# Patient Record
Sex: FEMALE | Race: WHITE | NOT HISPANIC OR LATINO | Employment: OTHER | URBAN - NONMETROPOLITAN AREA
[De-identification: names, ages, dates, MRNs, and addresses within clinical notes are randomized per-mention and may not be internally consistent; named-entity substitution may affect disease eponyms.]

---

## 2024-02-23 ENCOUNTER — OFFICE VISIT (OUTPATIENT)
Dept: FAMILY MEDICINE CLINIC | Facility: CLINIC | Age: 69
End: 2024-02-23
Payer: MEDICARE

## 2024-02-23 VITALS
SYSTOLIC BLOOD PRESSURE: 126 MMHG | DIASTOLIC BLOOD PRESSURE: 80 MMHG | HEART RATE: 96 BPM | HEIGHT: 66 IN | TEMPERATURE: 98.6 F | BODY MASS INDEX: 24.75 KG/M2 | WEIGHT: 154 LBS | OXYGEN SATURATION: 96 %

## 2024-02-23 DIAGNOSIS — F17.211 CIGARETTE NICOTINE DEPENDENCE IN REMISSION: ICD-10-CM

## 2024-02-23 DIAGNOSIS — R68.89 FORGETFULNESS: ICD-10-CM

## 2024-02-23 DIAGNOSIS — H53.8 CLOUDY VISION: ICD-10-CM

## 2024-02-23 DIAGNOSIS — R63.1 POLYDIPSIA: ICD-10-CM

## 2024-02-23 DIAGNOSIS — R35.89 POLYURIA: ICD-10-CM

## 2024-02-23 DIAGNOSIS — Z00.00 MEDICARE ANNUAL WELLNESS VISIT, SUBSEQUENT: ICD-10-CM

## 2024-02-23 DIAGNOSIS — K90.9 INTESTINAL MALABSORPTION, UNSPECIFIED TYPE: ICD-10-CM

## 2024-02-23 DIAGNOSIS — R41.840 ATTENTION DEFICIT: ICD-10-CM

## 2024-02-23 DIAGNOSIS — E78.2 MIXED HYPERLIPIDEMIA: ICD-10-CM

## 2024-02-23 DIAGNOSIS — Z12.31 ENCOUNTER FOR SCREENING MAMMOGRAM FOR BREAST CANCER: ICD-10-CM

## 2024-02-23 DIAGNOSIS — R63.4 WEIGHT LOSS: ICD-10-CM

## 2024-02-23 DIAGNOSIS — H53.8 BLURRED VISION, BILATERAL: ICD-10-CM

## 2024-02-23 DIAGNOSIS — R41.3 MEMORY CHANGES: ICD-10-CM

## 2024-02-23 DIAGNOSIS — Z76.89 ENCOUNTER TO ESTABLISH CARE: Primary | ICD-10-CM

## 2024-02-23 DIAGNOSIS — E78.1 HYPERTRIGLYCERIDEMIA: ICD-10-CM

## 2024-02-23 PROBLEM — F32.A DEPRESSION: Status: ACTIVE | Noted: 2024-02-23

## 2024-02-23 PROBLEM — E78.5 HYPERLIPIDEMIA: Status: ACTIVE | Noted: 2024-02-23

## 2024-02-23 PROBLEM — F41.9 ANXIETY: Status: ACTIVE | Noted: 2024-02-23

## 2024-02-23 PROBLEM — Z87.891 HISTORY OF TOBACCO ABUSE: Status: ACTIVE | Noted: 2024-02-23

## 2024-02-23 PROBLEM — R91.8 LUNG NODULES: Status: ACTIVE | Noted: 2022-03-23

## 2024-02-23 PROBLEM — R31.29 MICROSCOPIC HEMATURIA: Status: ACTIVE | Noted: 2024-02-23

## 2024-02-23 LAB — SL AMB POCT HEMOGLOBIN AIC: 5.4 (ref ?–6.5)

## 2024-02-23 PROCEDURE — 83036 HEMOGLOBIN GLYCOSYLATED A1C: CPT | Performed by: NURSE PRACTITIONER

## 2024-02-23 PROCEDURE — 99204 OFFICE O/P NEW MOD 45 MIN: CPT | Performed by: NURSE PRACTITIONER

## 2024-02-23 PROCEDURE — G0438 PPPS, INITIAL VISIT: HCPCS | Performed by: NURSE PRACTITIONER

## 2024-02-23 RX ORDER — ROSUVASTATIN CALCIUM 20 MG/1
20 TABLET, COATED ORAL DAILY
COMMUNITY
Start: 2024-01-12 | End: 2025-01-11

## 2024-02-23 RX ORDER — ESCITALOPRAM OXALATE 10 MG/1
10 TABLET ORAL DAILY
COMMUNITY
Start: 2023-09-01

## 2024-02-23 NOTE — PROGRESS NOTES
Assessment and Plan:   Presents is a 69 yr old female   in office to establish care   She is here for follow up on multiple diagnoses and medicare wellness follow up   Currently on Lexapro for anxiety   Crestor on hypercholesteremia and elevated triglycerides   Concerns discussed about some cloudy vision memory changes . She has had some urinary frequency  and some weight loss. Diabetic panel done in office and was stable at 5.4. I have ordered further work up and blood work and I will see her back in few weeks   I have ordered CT head due to concerns with feeling faint weakness and memory issues - forgetfulness  She is due for lung cancer screen so I ordered Ct lung screen  and CT abdomen  and pelvis due to weight loss and bloating. Absorption issues and reflux   Problem List Items Addressed This Visit       Hypertriglyceridemia    Relevant Medications    rosuvastatin (CRESTOR) 20 MG tablet    Other Relevant Orders    CBC and differential    Comprehensive metabolic panel    TSH, 3rd generation with Free T4 reflex    UA (URINE) with reflex to Scope    Lipid panel    POCT hemoglobin A1c (Completed)    Hyperlipidemia    Relevant Medications    rosuvastatin (CRESTOR) 20 MG tablet    Other Relevant Orders    CBC and differential    Comprehensive metabolic panel    TSH, 3rd generation with Free T4 reflex    UA (URINE) with reflex to Scope    Lipid panel     Other Visit Diagnoses       Encounter to establish care    -  Primary    Medicare annual wellness visit, subsequent        Encounter for screening mammogram for breast cancer        Relevant Orders    Mammo screening bilateral w 3d & cad    Memory changes        Relevant Orders    POCT hemoglobin A1c (Completed)    Cloudy vision        Relevant Orders    POCT hemoglobin A1c (Completed)    CT abdomen pelvis wo contrast    Blurred vision, bilateral        Relevant Orders    POCT hemoglobin A1c (Completed)    Polyuria        Relevant Orders    POCT hemoglobin A1c  (Completed)    CT abdomen pelvis wo contrast    Weight loss        -20 lbs    Relevant Orders    POCT hemoglobin A1c (Completed)    CT abdomen pelvis wo contrast    Polydipsia        Relevant Orders    POCT hemoglobin A1c (Completed)    CT abdomen pelvis wo contrast    Intestinal malabsorption, unspecified type        weight loss - 20 lbs    Relevant Orders    POCT hemoglobin A1c (Completed)    CT abdomen pelvis wo contrast    Attention deficit        Forgetfulness        Relevant Orders    CT abdomen pelvis wo contrast    Cigarette nicotine dependence in remission        Relevant Orders    CT lung screening program            Depression Screening and Follow-up Plan: Patient was screened for depression during today's encounter. They screened negative with a PHQ-2 score of 0.    Falls Plan of Care: balance, strength, and gait training instructions were provided. Recommended assistive device to help with gait and balance. Medications that increase falls were reviewed. Assessed feet and footwear. Vitamin D supplementation was recommended. Home safety education provided.     Lung Cancer Screening Shared Decision Making: I discussed with her that she is a candidate for lung cancer CT screening.     The following Shared Decision-Making points were covered:  Benefits of screening were discussed, including the rates of reduction in death from lung cancer and other causes.  Harms of screening were reviewed, including false positive tests, radiation exposure levels, risks of invasive procedures, risks of complications of screening, and risk of overdiagnosis.  I counseled on the importance of adherence to annual lung cancer LDCT screening, impact of co-morbidities, and ability or willingness to undergo diagnosis and treatment.  I counseled on the importance of maintaining abstinence as a former smoker or was counseled on the importance of smoking cessation if a current smoker    Review of Eligibility Criteria: She meets all of  the criteria for Lung Cancer Screening.   - She is 69 y.o.   - She has 20 pack year tobacco history and is a current smoker or has quit within the past 15 years  - She presents no signs or symptoms of lung cancer    After discussion, the patient decided to elect lung cancer screening.      Preventive health issues were discussed with patient, and age appropriate screening tests were ordered as noted in patient's After Visit Summary.  Personalized health advice and appropriate referrals for health education or preventive services given if needed, as noted in patient's After Visit Summary.     History of Present Illness:     Patient presents for a Medicare Wellness Visit    Presents is a 69 yr old female   in office to establish care   She is here for follow up on multiple diagnoses and medicare wellness follow up   Currently on Lexapro for anxiety   Crestor on hypercholesteremia and elevated triglycerides   Concerns discussed about some cloudy vision memory changes . She has had some urinary frequency  and some weight loss. Diabetic panel done in office and was stable at 5.4. I have ordered further work up and blood work and I will see her back in few weeks   I have ordered CT head due to concerns with feeling faint weakness and memory issues - forgetfulness  She is due for lung cancer screen so I ordered Ct lung screen  and CT abdomen  and pelvis due to weight loss and bloating. Absorption issues and reflux        Patient Care Team:  BLAIRE Leiva as PCP - General (Nurse Practitioner)     Review of Systems:     Review of Systems   Constitutional:  Positive for fatigue and unexpected weight change (Positive for weight loss). Negative for fever.   HENT:  Negative for sinus pressure, sinus pain and sore throat.    Eyes:  Positive for photophobia and visual disturbance (blurred vision).   Respiratory:  Negative for cough and shortness of breath.    Cardiovascular:  Negative for chest pain, palpitations and leg  swelling.   Gastrointestinal:  Positive for abdominal distention, diarrhea and nausea.        Weight loss and change in bowel habitus    Genitourinary:  Negative for difficulty urinating and flank pain.   Musculoskeletal:  Positive for arthralgias and myalgias.   Skin:  Negative for rash.   Allergic/Immunologic: Positive for environmental allergies.   Neurological:  Positive for headaches.   Hematological:  Negative for adenopathy.   Psychiatric/Behavioral:  Negative for sleep disturbance and suicidal ideas. The patient is nervous/anxious.         Problem List:     Patient Active Problem List   Diagnosis    Microscopic hematuria    Lung nodules    Hypertriglyceridemia    Hyperlipidemia    History of tobacco abuse    Depression    Anxiety    Adverse reaction to statin medication    Abnormal electrocardiogram      Past Medical and Surgical History:     Past Medical History:   Diagnosis Date    Depression     Hyperlipemia      History reviewed. No pertinent surgical history.   Family History:     Family History   Problem Relation Age of Onset    COPD Mother     Heart disease Mother     Lung cancer Father       Social History:     Social History     Socioeconomic History    Marital status: /Civil Union     Spouse name: None    Number of children: None    Years of education: None    Highest education level: None   Occupational History    None   Tobacco Use    Smoking status: Former     Current packs/day: 0.00     Average packs/day: 1.5 packs/day for 25.0 years (37.5 ttl pk-yrs)     Types: Cigarettes     Start date:      Quit date:      Years since quittin.1    Smokeless tobacco: Never   Vaping Use    Vaping status: Never Used   Substance and Sexual Activity    Alcohol use: Never    Drug use: Never    Sexual activity: None   Other Topics Concern    None   Social History Narrative    None     Social Determinants of Health     Financial Resource Strain: Patient Declined (2024)    Overall Financial  Resource Strain (CARDIA)     Difficulty of Paying Living Expenses: Patient declined   Food Insecurity: Unknown (12/11/2022)    Received from [facility]    Hunger Vital Sign     Worried About Running Out of Food in the Last Year: Never true     Ran Out of Food in the Last Year: Not on file   Transportation Needs: No Transportation Needs (2/23/2024)    PRAPARE - Transportation     Lack of Transportation (Medical): No     Lack of Transportation (Non-Medical): No   Physical Activity: Not on file   Stress: Not on file   Social Connections: Unknown (12/11/2022)    Received from [facility]    Social Connection and Isolation Panel [NHANES]     Frequency of Communication with Friends and Family: More than three times a week     Frequency of Social Gatherings with Friends and Family: Not on file     Attends [employer] Services: Not on file     Active Member of Clubs or Organizations: Not on file     Attends Club or Organization Meetings: Not on file     Marital Status: Not on file   Intimate Partner Violence: Unknown (12/11/2022)    Received from [facility]    Humiliation, Afraid, Rape, and Kick questionnaire     Fear of Current or Ex-Partner: No     Emotionally Abused: Not on file     Physically Abused: Not on file     Sexually Abused: Not on file   Housing Stability: Unknown (12/11/2022)    Received from [facility]    Housing Stability Vital Sign     Unable to Pay for Housing in the Last Year: No     Number of Places Lived in the Last Year: Not on file     Unstable Housing in the Last Year: Not on file      Medications and Allergies:     Current Outpatient Medications   Medication Sig Dispense Refill    escitalopram (LEXAPRO) 10 mg tablet Take 10 mg by mouth daily      rosuvastatin (CRESTOR) 20 MG tablet Take 20 mg by mouth daily       No current facility-administered medications for this visit.     No Known Allergies   Immunizations:     Immunization History   Administered Date(s) Administered     COVID-19 MODERNA VACC 0.5 ML IM 03/18/2021, 04/18/2021, 02/23/2022    COVID-19 Moderna Vac BIVALENT 12 Yr+ IM 0.5 ML 02/07/2023    DTaP 02/09/2015    INFLUENZA 10/27/2022, 10/17/2023    Influenza, high dose seasonal 0.7 mL 11/17/2022    Influenza, seasonal, injectable, preservative free 10/25/2011, 09/26/2012, 10/07/2013    Pneumococcal Polysaccharide PPV23 11/18/2021    Tdap 09/26/2012    Zoster 12/08/2014      Health Maintenance:         Topic Date Due    Hepatitis C Screening  Never done    Breast Cancer Screening: Mammogram  07/01/2017    Colorectal Cancer Screening  12/07/2024         Topic Date Due    Pneumococcal Vaccine: 65+ Years (2 of 2 - PCV) 11/18/2022    COVID-19 Vaccine (5 - 2023-24 season) 09/01/2023      Medicare Screening Tests and Risk Assessments:     Radha is here for her Subsequent Wellness visit.     Health Risk Assessment:   Patient rates overall health as good. Patient feels that their physical health rating is slightly worse. Patient is satisfied with their life. Eyesight was rated as much worse. Hearing was rated as same. Patient feels that their emotional and mental health rating is same. Patients states they are sometimes angry. Patient states they are sometimes unusually tired/fatigued. Pain experienced in the last 7 days has been a lot. Patient's pain rating has been 7/10. Patient states that she has experienced weight loss or gain in last 6 months.     Depression Screening:   PHQ-2 Score: 0      Fall Risk Screening:   In the past year, patient has experienced: no history of falling in past year      Urinary Incontinence Screening:   Patient has not leaked urine accidently in the last six months.     Home Safety:  Patient does not have trouble with stairs inside or outside of their home. Patient has working smoke alarms and has working carbon monoxide detector. Home safety hazards include: none.     Nutrition:   Current diet is Regular.     Medications:   Patient is not currently taking  any over-the-counter supplements. Patient is able to manage medications.     Activities of Daily Living (ADLs)/Instrumental Activities of Daily Living (IADLs):   Walk and transfer into and out of bed and chair?: Yes  Dress and groom yourself?: Yes    Bathe or shower yourself?: Yes    Feed yourself? Yes  Do your laundry/housekeeping?: Yes  Manage your money, pay your bills and track your expenses?: Yes  Make your own meals?: Yes    Do your own shopping?: Yes    Previous Hospitalizations:   Any hospitalizations or ED visits within the last 12 months?: No      Advance Care Planning:   Living will: Yes    Advanced directive: Yes    Advanced directive counseling given: Yes    ACP document given: Yes      Cognitive Screening:   Provider or family/friend/caregiver concerned regarding cognition?: No    PREVENTIVE SCREENINGS      Cardiovascular Screening:    General: Screening Not Indicated and History Lipid Disorder      Diabetes Screening:     General: Screening Current      Colorectal Cancer Screening:     General: Screening Current      Cervical Cancer Screening:    General: Screening Not Indicated      Osteoporosis Screening:    General: Risks and Benefits Discussed    Due for: Bone Density Ultrasound      Lung Cancer Screening:     General: Screening Not Indicated    Screening, Brief Intervention, and Referral to Treatment (SBIRT)    Screening  Typical number of drinks in a day: 0  Typical number of drinks in a week: 0  Interpretation: Low risk drinking behavior.    Single Item Drug Screening:  How often have you used an illegal drug (including marijuana) or a prescription medication for non-medical reasons in the past year? never    Single Item Drug Screen Score: 0  Interpretation: Negative screen for possible drug use disorder    Time Spent  Time spent screening/evaluating the patient for alcohol misuse: 0 minutes. Time spent providing alcohol/substance abuse assessment and intervention services: 0 minutes.    Other  "Counseling Topics:   Car/seat belt/driving safety, skin self-exam, sunscreen and calcium and vitamin D intake and regular weightbearing exercise.     No results found.     Physical Exam:     /80 (BP Location: Right arm, Patient Position: Sitting, Cuff Size: Adult)   Pulse 96   Temp 98.6 °F (37 °C)   Ht 5' 5.75\" (1.67 m)   Wt 69.9 kg (154 lb)   SpO2 96%   BMI 25.05 kg/m²     Physical Exam  Vitals and nursing note reviewed.   Constitutional:       Appearance: Normal appearance.      Comments: BMI 25.05   HENT:      Head: Atraumatic.      Right Ear: Tympanic membrane normal.      Left Ear: Tympanic membrane normal.   Eyes:      Extraocular Movements: Extraocular movements intact.   Cardiovascular:      Rate and Rhythm: Normal rate and regular rhythm.      Pulses: Normal pulses.      Heart sounds: Normal heart sounds.   Pulmonary:      Effort: Pulmonary effort is normal.      Breath sounds: Normal breath sounds.   Abdominal:      General: There is distension.      Palpations: Abdomen is soft.      Comments: Weight loss concerns    Musculoskeletal:      Cervical back: Normal range of motion.      Right lower leg: No edema.      Left lower leg: No edema.   Skin:     General: Skin is warm.      Capillary Refill: Capillary refill takes less than 2 seconds.   Neurological:      Mental Status: She is alert and oriented to person, place, and time.   Psychiatric:         Mood and Affect: Mood normal.         Behavior: Behavior normal.          BLAIRE Leiva  "

## 2024-03-17 ENCOUNTER — HOSPITAL ENCOUNTER (OUTPATIENT)
Dept: CT IMAGING | Facility: HOSPITAL | Age: 69
Discharge: HOME | End: 2024-03-17
Payer: MEDICARE

## 2024-03-17 DIAGNOSIS — R35.89 POLYURIA: ICD-10-CM

## 2024-03-17 DIAGNOSIS — H53.8 CLOUDY VISION: ICD-10-CM

## 2024-03-17 DIAGNOSIS — R41.3 MEMORY CHANGES: ICD-10-CM

## 2024-03-17 DIAGNOSIS — R68.89 FORGETFULNESS: ICD-10-CM

## 2024-03-17 DIAGNOSIS — R63.4 WEIGHT LOSS: ICD-10-CM

## 2024-03-17 DIAGNOSIS — K90.9 INTESTINAL MALABSORPTION, UNSPECIFIED TYPE: ICD-10-CM

## 2024-03-17 DIAGNOSIS — H53.8 BLURRED VISION, BILATERAL: ICD-10-CM

## 2024-03-17 DIAGNOSIS — R63.1 POLYDIPSIA: ICD-10-CM

## 2024-03-17 PROCEDURE — 70450 CT HEAD/BRAIN W/O DYE: CPT

## 2024-03-17 PROCEDURE — 74176 CT ABD & PELVIS W/O CONTRAST: CPT

## 2024-03-18 ENCOUNTER — APPOINTMENT (OUTPATIENT)
Dept: LAB | Facility: CLINIC | Age: 69
End: 2024-03-18
Payer: MEDICARE

## 2024-03-18 DIAGNOSIS — E78.1 HYPERTRIGLYCERIDEMIA: ICD-10-CM

## 2024-03-18 DIAGNOSIS — E78.2 MIXED HYPERLIPIDEMIA: ICD-10-CM

## 2024-03-18 LAB
ALBUMIN SERPL BCP-MCNC: 4.6 G/DL (ref 3.5–5)
ALP SERPL-CCNC: 78 U/L (ref 34–104)
ALT SERPL W P-5'-P-CCNC: 18 U/L (ref 7–52)
ANION GAP SERPL CALCULATED.3IONS-SCNC: 10 MMOL/L (ref 4–13)
AST SERPL W P-5'-P-CCNC: 22 U/L (ref 13–39)
BASOPHILS # BLD AUTO: 0.04 THOUSANDS/ÂΜL (ref 0–0.1)
BASOPHILS NFR BLD AUTO: 1 % (ref 0–1)
BILIRUB SERPL-MCNC: 0.47 MG/DL (ref 0.2–1)
BILIRUB UR QL STRIP: NEGATIVE
BUN SERPL-MCNC: 12 MG/DL (ref 5–25)
CALCIUM SERPL-MCNC: 9.5 MG/DL (ref 8.4–10.2)
CHLORIDE SERPL-SCNC: 102 MMOL/L (ref 96–108)
CLARITY UR: CLEAR
CO2 SERPL-SCNC: 27 MMOL/L (ref 21–32)
COLOR UR: NORMAL
CREAT SERPL-MCNC: 0.79 MG/DL (ref 0.6–1.3)
EOSINOPHIL # BLD AUTO: 0.36 THOUSAND/ÂΜL (ref 0–0.61)
EOSINOPHIL NFR BLD AUTO: 6 % (ref 0–6)
ERYTHROCYTE [DISTWIDTH] IN BLOOD BY AUTOMATED COUNT: 12 % (ref 11.6–15.1)
GFR SERPL CREATININE-BSD FRML MDRD: 77 ML/MIN/1.73SQ M
GLUCOSE P FAST SERPL-MCNC: 91 MG/DL (ref 65–99)
GLUCOSE UR STRIP-MCNC: NEGATIVE MG/DL
HCT VFR BLD AUTO: 37.9 % (ref 34.8–46.1)
HGB BLD-MCNC: 12.7 G/DL (ref 11.5–15.4)
HGB UR QL STRIP.AUTO: NEGATIVE
IMM GRANULOCYTES # BLD AUTO: 0.02 THOUSAND/UL (ref 0–0.2)
IMM GRANULOCYTES NFR BLD AUTO: 0 % (ref 0–2)
KETONES UR STRIP-MCNC: NEGATIVE MG/DL
LEUKOCYTE ESTERASE UR QL STRIP: NEGATIVE
LYMPHOCYTES # BLD AUTO: 2.42 THOUSANDS/ÂΜL (ref 0.6–4.47)
LYMPHOCYTES NFR BLD AUTO: 39 % (ref 14–44)
MCH RBC QN AUTO: 30.8 PG (ref 26.8–34.3)
MCHC RBC AUTO-ENTMCNC: 33.5 G/DL (ref 31.4–37.4)
MCV RBC AUTO: 92 FL (ref 82–98)
MONOCYTES # BLD AUTO: 0.47 THOUSAND/ÂΜL (ref 0.17–1.22)
MONOCYTES NFR BLD AUTO: 8 % (ref 4–12)
NEUTROPHILS # BLD AUTO: 2.87 THOUSANDS/ÂΜL (ref 1.85–7.62)
NEUTS SEG NFR BLD AUTO: 46 % (ref 43–75)
NITRITE UR QL STRIP: NEGATIVE
NRBC BLD AUTO-RTO: 0 /100 WBCS
PH UR STRIP.AUTO: 7 [PH]
PLATELET # BLD AUTO: 264 THOUSANDS/UL (ref 149–390)
PMV BLD AUTO: 10.1 FL (ref 8.9–12.7)
POTASSIUM SERPL-SCNC: 4.4 MMOL/L (ref 3.5–5.3)
PROT SERPL-MCNC: 7.2 G/DL (ref 6.4–8.4)
PROT UR STRIP-MCNC: NEGATIVE MG/DL
RBC # BLD AUTO: 4.12 MILLION/UL (ref 3.81–5.12)
SODIUM SERPL-SCNC: 139 MMOL/L (ref 135–147)
SP GR UR STRIP.AUTO: 1.01 (ref 1–1.03)
TSH SERPL DL<=0.05 MIU/L-ACNC: 1.48 UIU/ML (ref 0.45–4.5)
UROBILINOGEN UR STRIP-ACNC: <2 MG/DL
WBC # BLD AUTO: 6.18 THOUSAND/UL (ref 4.31–10.16)

## 2024-03-18 NOTE — RESULT ENCOUNTER NOTE
IMPRESSION:    1. Nonobstructing left renal calculi.  2. No adenopathy.    Workstation performed: [id number]

## 2024-03-18 NOTE — RESULT ENCOUNTER NOTE
Brain CT shows chronic changes over time but no acute strokes     PARENCHYMA: Decreased attenuation is noted in periventricular and subcortical white matter demonstrating an appearance that is statistically most likely to represent mild microangiopathic change.

## 2024-03-22 ENCOUNTER — OFFICE VISIT (OUTPATIENT)
Dept: FAMILY MEDICINE CLINIC | Facility: CLINIC | Age: 69
End: 2024-03-22
Payer: MEDICARE

## 2024-03-22 VITALS
SYSTOLIC BLOOD PRESSURE: 110 MMHG | OXYGEN SATURATION: 95 % | HEIGHT: 66 IN | HEART RATE: 94 BPM | WEIGHT: 152 LBS | BODY MASS INDEX: 24.43 KG/M2 | DIASTOLIC BLOOD PRESSURE: 64 MMHG | TEMPERATURE: 98.4 F

## 2024-03-22 DIAGNOSIS — F41.9 ANXIETY: ICD-10-CM

## 2024-03-22 DIAGNOSIS — J34.89 SINUS MUCOSAL THICKENING: ICD-10-CM

## 2024-03-22 DIAGNOSIS — I73.9 MICROANGIOPATHY (HCC): ICD-10-CM

## 2024-03-22 DIAGNOSIS — E78.2 MIXED HYPERLIPIDEMIA: ICD-10-CM

## 2024-03-22 DIAGNOSIS — F32.89 OTHER DEPRESSION: Primary | ICD-10-CM

## 2024-03-22 DIAGNOSIS — E78.1 HYPERTRIGLYCERIDEMIA: ICD-10-CM

## 2024-03-22 PROCEDURE — G2211 COMPLEX E/M VISIT ADD ON: HCPCS | Performed by: NURSE PRACTITIONER

## 2024-03-22 PROCEDURE — 99214 OFFICE O/P EST MOD 30 MIN: CPT | Performed by: NURSE PRACTITIONER

## 2024-03-22 NOTE — PROGRESS NOTES
Depression Screening and Follow-up Plan: Patient was screened for depression during today's encounter. They screened negative with a PHQ-9 score of 1.    Falls Plan of Care: balance, strength, and gait training instructions were provided and referral to physical therapy. Recommended assistive device to help with gait and balance. Medications that increase falls were reviewed. Assessed feet and footwear. Vitamin D supplementation was recommended. Home safety education provided.     Assessment/Plan:    Pt is a 69 yr old female   Presents in office for follow up  diagnostic testing   Noted chronic microangiopathy on CT brain. She is no longer a smoker   Denies any smoking currently stopped 20 yrs ago.   Discussed BP management   Hyperlipidemia- managed by cardiology next lipid panel is due in few months    Healthy diet and adequate hydration.   Does have some non obstructive renal stones.   Discussed findings in relations with her forgetfulness and weight changes. Discussed healthy diet , hydration , increased activity   Can take some magnesium daily supplement and B12.      Cardiology -> Dr Maggie MARRERO - will reach out to him to discuss Aspirin 81 mg daily.      Problem List Items Addressed This Visit       Hypertriglyceridemia     On Crestor 20 mg daily   Low fat low cholesterol diet          Hyperlipidemia    Depression - Primary    Anxiety     Stable on lexapro denies any issues           Other Visit Diagnoses       Microangiopathy (HCC)        Noted on CT brain   Discussed  cholesterol management   sees Cardiology will discuss aspirin usage   stopped smoking over 20 yrs ago    Sinus mucosal thickening        Relevant Medications    amoxicillin (AMOXIL) 875 mg tablet    montelukast (SINGULAIR) 10 mg tablet              Subjective:      Patient ID: Radha Santos is a 69 y.o. female.      Pt is a 69 yr old female   Presents in office for follow up  diagnostic testing         The following portions of the  patient's history were reviewed and updated as appropriate:   Past Medical History:  She has a past medical history of Depression and Hyperlipemia.,  _______________________________________________________________________  Medical Problems:  does not have any pertinent problems on file.,  _______________________________________________________________________  Past Surgical History:   has no past surgical history on file.,  _______________________________________________________________________  Family History:  family history includes COPD in her mother; Heart disease in her mother; Lung cancer in her father.,  _______________________________________________________________________  Social History:   reports that she quit smoking about 25 years ago. Her smoking use included cigarettes. She started smoking about 50 years ago. She has a 37.5 pack-year smoking history. She has never used smokeless tobacco. She reports that she does not drink alcohol and does not use drugs.,  _______________________________________________________________________  Allergies:  has No Known Allergies..  _______________________________________________________________________  Current Outpatient Medications   Medication Sig Dispense Refill    amoxicillin (AMOXIL) 875 mg tablet Take 1 tablet (875 mg total) by mouth 2 (two) times a day for 5 days 10 tablet 0    escitalopram (LEXAPRO) 10 mg tablet Take 10 mg by mouth daily      montelukast (SINGULAIR) 10 mg tablet Take 1 tablet (10 mg total) by mouth daily at bedtime 30 tablet 0    rosuvastatin (CRESTOR) 20 MG tablet Take 20 mg by mouth daily       No current facility-administered medications for this visit.     _______________________________________________________________________  Review of Systems   Constitutional:  Positive for fatigue. Negative for fever and unexpected weight change (weight loss).   HENT:  Negative for congestion, nosebleeds and sore throat.    Eyes: Negative.   "  Respiratory:  Negative for cough and shortness of breath.    Cardiovascular:  Negative for chest pain and palpitations.        Hyperlipidemia  Sees Cardiology    Gastrointestinal:  Negative for abdominal distention, abdominal pain, nausea and vomiting.   Endocrine: Negative.    Genitourinary:  Negative for difficulty urinating and flank pain.   Musculoskeletal:  Positive for arthralgias and myalgias.   Skin:  Negative for rash.   Neurological:  Negative for headaches.   Hematological:  Negative for adenopathy.   Psychiatric/Behavioral:  Negative for sleep disturbance and suicidal ideas. The patient is nervous/anxious (stable).          Objective:  Vitals:    03/22/24 1415   BP: 110/64   BP Location: Left arm   Patient Position: Sitting   Cuff Size: Adult   Pulse: 94   Temp: 98.4 °F (36.9 °C)   SpO2: 95%   Weight: 68.9 kg (152 lb)   Height: 5' 5.75\" (1.67 m)     Body mass index is 24.72 kg/m².     Physical Exam  Vitals and nursing note reviewed.   Constitutional:       Appearance: Normal appearance.      Comments: BMI 24.72    HENT:      Head: Atraumatic.   Eyes:      Extraocular Movements: Extraocular movements intact.   Cardiovascular:      Rate and Rhythm: Normal rate and regular rhythm.      Pulses: Normal pulses.      Heart sounds: Normal heart sounds.   Pulmonary:      Breath sounds: Normal breath sounds.   Abdominal:      Palpations: Abdomen is soft.   Musculoskeletal:      Cervical back: Normal range of motion.      Right lower leg: No edema.      Left lower leg: No edema.   Neurological:      Mental Status: She is alert and oriented to person, place, and time.   Psychiatric:         Mood and Affect: Mood normal.         Behavior: Behavior normal.           CT head wo contrast  Status: Final result     PACS Images     Show images for CT head wo contrast    CT head wo contrast: Result Notes     BLAIRE Leiva  3/18/2024  9:06 AM EDT       Brain CT shows chronic changes over time but no acute strokes   "   PARENCHYMA: Decreased attenuation is noted in periventricular and subcortical white matter demonstrating an appearance that is statistically most likely to represent mild microangiopathic change.            Study Result    Narrative & Impression   CT BRAIN - WITHOUT CONTRAST     INDICATION:   R41.3: Other amnesia  H53.8: Other visual disturbances  R63.4: Abnormal weight loss  R68.89: Other general symptoms and signs.     COMPARISON:  None.     TECHNIQUE:  CT examination of the brain was performed.  Multiplanar 2D reformatted images were created from the source data.     Radiation dose length product (DLP) for this visit:  851 mGy-cm .  This examination, like all CT scans performed in the [facility], was performed utilizing techniques to minimize radiation dose exposure, including the use of iterative   reconstruction and automated exposure control.     IMAGE QUALITY:  Diagnostic.     FINDINGS:     PARENCHYMA: Decreased attenuation is noted in periventricular and subcortical white matter demonstrating an appearance that is statistically most likely to represent mild microangiopathic change.     No CT signs of acute infarction.  No intracranial mass, mass effect or midline shift.  No acute parenchymal hemorrhage.     VENTRICLES AND EXTRA-AXIAL SPACES:  Normal for the patient's age.     VISUALIZED ORBITS: Normal visualized orbits.     PARANASAL SINUSES: Mild mucosal thickening of the visualized paranasal sinuses.     CALVARIUM AND EXTRACRANIAL SOFT TISSUES:  Normal.     IMPRESSION:     No acute intracranial abnormality.  Chronic microangiopathic changes.                 Workstation performed: [id number]      CT abdomen pelvis wo contrast  Status: Final result     PACS Images     Show images for CT abdomen pelvis wo contrast    CT abdomen pelvis wo contrast: Result Notes     BLAIRE Leiva  3/18/2024 10:22 AM EDT       IMPRESSION:     1. Nonobstructing left renal calculi.  2. No adenopathy.      Workstation performed: [id number]            Study Result    Narrative & Impression   CT ABDOMEN AND PELVIS WITHOUT IV CONTRAST     INDICATION: H53.8: Other visual disturbances  R35.89: Other polyuria  R63.4: Abnormal weight loss  R63.1: Polydipsia  K90.9: Intestinal malabsorption, unspecified  R68.89: Other general symptoms and signs.     COMPARISON: None.     TECHNIQUE: CT examination of the abdomen and pelvis was performed without intravenous contrast. Multiplanar 2D reformatted images were created from the source data.     This examination, like all CT scans performed in the [facility], was performed utilizing techniques to minimize radiation dose exposure, including the use of iterative reconstruction and automated exposure control. Radiation dose length   product (DLP) for this visit: 656 mGy-cm     Enteric Contrast: Not administered.     FINDINGS:     ABDOMEN     LOWER CHEST: No clinically significant abnormality in the visualized lower chest.     LIVER/BILIARY TREE: Unremarkable.     GALLBLADDER: No calcified gallstones. No pericholecystic inflammatory change.     SPLEEN: Unremarkable.     PANCREAS: Unremarkable.     ADRENAL GLANDS: Unremarkable.     KIDNEYS/URETERS: Several 2 to 3 mm calculi in the left kidney. Kidneys without hydronephrosis or hydroureter.     STOMACH AND BOWEL: Unremarkable.     APPENDIX: No findings to suggest appendicitis.     ABDOMINOPELVIC CAVITY: No ascites. No pneumoperitoneum. No lymphadenopathy.     VESSELS: Atherosclerosis without abdominal aortic aneurysm.     PELVIS     REPRODUCTIVE ORGANS: Unremarkable for patient's age.     URINARY BLADDER: Unremarkable.     ABDOMINAL WALL/INGUINAL REGIONS: Fat-containing umbilical hernia.     BONES: No acute fracture or suspicious osseous lesion. Spinal degenerative changes.     IMPRESSION:     1. Nonobstructing left renal calculi.  2. No adenopathy.     Workstation performed: [id number]      CBC and differential  Order:  [id number]   Status: Final result       Visible to patient: Yes (seen)       Next appt: 05/17/2024 at 02:20 PM in Family Medicine (BLAIRE Leiva)       Dx: Mixed hyperlipidemia; Hypertriglyceri...    1 Result Note       1 Patient Communication      Component  Ref Range & Units 3/18/24  9:39 AM   WBC  4.31 - 10.16 Thousand/uL 6.18   RBC  3.81 - 5.12 Million/uL 4.12   Hemoglobin  11.5 - 15.4 g/dL 12.7   Hematocrit  34.8 - 46.1 % 37.9   MCV  82 - 98 fL 92   MCH  26.8 - 34.3 pg 30.8   MCHC  31.4 - 37.4 g/dL 33.5   RDW  11.6 - 15.1 % 12.0   MPV  8.9 - 12.7 fL 10.1   Platelets  149 - 390 Thousands/uL 264   nRBC  /100 WBCs 0   Neutrophils Relative  43 - 75 % 46   Immature Grans %  0 - 2 % 0   Lymphocytes Relative  14 - 44 % 39   Monocytes Relative  4 - 12 % 8   Eosinophils Relative  0 - 6 % 6   Basophils Relative  0 - 1 % 1   Neutrophils Absolute  1.85 - 7.62 Thousands/µL 2.87   Absolute Immature Grans  0.00 - 0.20 Thousand/uL 0.02   Absolute Lymphocytes  0.60 - 4.47 Thousands/µL 2.42   Absolute Monocytes  0.17 - 1.22 Thousand/µL 0.47   Eosinophils Absolute  0.00 - 0.61 Thousand/µL 0.36   Basophils Absolute  0.00 - 0.10 Thousands/µL 0.04              Specimen Collected: 03/18/24  9:39 AM Last Resulted: 03/18/24  3:39 PM         Comprehensive metabolic panel  Order: [id number]   Status: Final result       Visible to patient: Yes (seen)       Next appt: 05/17/2024 at 02:20 PM in Family Medicine (BLAIRE Leiva)       Dx: Mixed hyperlipidemia; Hypertriglyceri...    0 Result Notes      Component  Ref Range & Units 3/18/24  9:39 AM   Sodium  135 - 147 mmol/L 139   Potassium  3.5 - 5.3 mmol/L 4.4   Chloride  96 - 108 mmol/L 102   CO2  21 - 32 mmol/L 27   ANION GAP  4 - 13 mmol/L 10   BUN  5 - 25 mg/dL 12   Creatinine  0.60 - 1.30 mg/dL 0.79   Comment: Standardized to IDMS reference method   Glucose, Fasting  65 - 99 mg/dL 91   Calcium  8.4 - 10.2 mg/dL 9.5   AST  13 - 39 U/L 22   ALT  7 - 52 U/L 18   Comment:  Specimen collection should occur prior to Sulfasalazine administration due to the potential for falsely depressed results.   Alkaline Phosphatase  34 - 104 U/L 78   Total Protein  6.4 - 8.4 g/dL 7.2   Albumin  3.5 - 5.0 g/dL 4.6   Total Bilirubin  0.20 - 1.00 mg/dL 0.47   Comment: Use of this assay is not recommended for patients undergoing treatment with eltrombopag due to the potential for falsely elevated results.  N-acetyl-p-benzoquinone imine (metabolite of Acetaminophen) will generate erroneously low results in samples for patients that have taken an overdose of Acetaminophen.   eGFR  ml/min/1.73sq m 77                 TSH, 3rd generation with Free T4 reflex  Order: [id number]   Status: Final result       Visible to patient: Yes (seen)       Next appt: 05/17/2024 at 02:20 PM in Family Medicine (BLAIRE Leiva)       Dx: Mixed hyperlipidemia; Hypertriglyceri...    0 Result Notes      Component  Ref Range & Units 3/18/24  9:39 AM   TSH 3RD GENERATON  0.450 - 4.500 uIU/mL 1.482      UA (URINE) with reflex to Scope  Order: [id number]   Status: Final result       Visible to patient: Yes (seen)       Next appt: 05/17/2024 at 02:20 PM in [facility] (BLAIRE Leiva)       Dx: Mixed hyperlipidemia; Hypertriglyceri...    0 Result Notes      Component  Ref Range & Units 3/18/24  9:39 AM   Color, UA Light Yellow   Clarity, UA Clear   Specific Gravity, UA  1.003 - 1.030 1.011   pH, UA  4.5, 5.0, 5.5, 6.0, 6.5, 7.0, 7.5, 8.0 7.0   Leukocytes, UA  Negative Negative   Nitrite, UA  Negative Negative   Protein, UA  Negative mg/dl Negative   Glucose, UA  Negative mg/dl Negative   Ketones, UA  Negative mg/dl Negative   Urobilinogen, UA  <2.0 mg/dl mg/dl <2.0   Bilirubin, UA  Negative Negative   Occult Blood, UA  Negative Negative              Specimen Collected: 03/18/24  9:39 AM Last Resulted: 03/18/24  6:35 PM

## 2024-03-25 ENCOUNTER — TELEPHONE (OUTPATIENT)
Dept: FAMILY MEDICINE CLINIC | Facility: CLINIC | Age: 69
End: 2024-03-25

## 2024-03-25 RX ORDER — AMOXICILLIN 875 MG/1
875 TABLET, COATED ORAL 2 TIMES DAILY
Qty: 10 TABLET | Refills: 0 | Status: SHIPPED | OUTPATIENT
Start: 2024-03-25 | End: 2024-03-30

## 2024-03-25 RX ORDER — MONTELUKAST SODIUM 10 MG/1
10 TABLET ORAL
Qty: 30 TABLET | Refills: 0 | Status: SHIPPED | OUTPATIENT
Start: 2024-03-25 | End: 2024-04-24

## 2024-03-25 NOTE — TELEPHONE ENCOUNTER
Anamika, pt saw you on 3/22/2024 - notes you supposed to sent in 2 scripts for her (abx and something else, unsure what? - not on active med list.      Pt uses :   [id number] - [place], [place] - [address]     Please review pts chart and assist,       Thank You!

## 2024-04-15 DIAGNOSIS — F32.89 OTHER DEPRESSION: Primary | ICD-10-CM

## 2024-04-16 RX ORDER — ESCITALOPRAM OXALATE 10 MG/1
10 TABLET ORAL DAILY
Qty: 30 TABLET | Refills: 2 | Status: SHIPPED | OUTPATIENT
Start: 2024-04-16 | End: 2024-07-15

## 2024-05-08 ENCOUNTER — APPOINTMENT (OUTPATIENT)
Dept: LAB | Facility: CLINIC | Age: 69
End: 2024-05-08
Payer: MEDICARE

## 2024-05-08 DIAGNOSIS — E78.5 HYPERLIPIDEMIA, UNSPECIFIED HYPERLIPIDEMIA TYPE: Primary | ICD-10-CM

## 2024-05-08 LAB
ALBUMIN SERPL BCP-MCNC: 4.4 G/DL (ref 3.5–5)
ALP SERPL-CCNC: 71 U/L (ref 34–104)
ALT SERPL W P-5'-P-CCNC: 18 U/L (ref 7–52)
ANION GAP SERPL CALCULATED.3IONS-SCNC: 7 MMOL/L (ref 4–13)
AST SERPL W P-5'-P-CCNC: 21 U/L (ref 13–39)
BILIRUB SERPL-MCNC: 0.39 MG/DL (ref 0.2–1)
BUN SERPL-MCNC: 13 MG/DL (ref 5–25)
CALCIUM SERPL-MCNC: 9.5 MG/DL (ref 8.4–10.2)
CHLORIDE SERPL-SCNC: 103 MMOL/L (ref 96–108)
CHOLEST SERPL-MCNC: 140 MG/DL
CK SERPL-CCNC: 74 U/L (ref 26–192)
CO2 SERPL-SCNC: 30 MMOL/L (ref 21–32)
CREAT SERPL-MCNC: 0.81 MG/DL (ref 0.6–1.3)
GFR SERPL CREATININE-BSD FRML MDRD: 74 ML/MIN/1.73SQ M
GLUCOSE P FAST SERPL-MCNC: 94 MG/DL (ref 65–99)
HDLC SERPL-MCNC: 49 MG/DL
LDLC SERPL CALC-MCNC: 58 MG/DL (ref 0–100)
NONHDLC SERPL-MCNC: 91 MG/DL
POTASSIUM SERPL-SCNC: 4.2 MMOL/L (ref 3.5–5.3)
PROT SERPL-MCNC: 7.4 G/DL (ref 6.4–8.4)
SODIUM SERPL-SCNC: 140 MMOL/L (ref 135–147)
TRIGL SERPL-MCNC: 163 MG/DL

## 2024-05-08 PROCEDURE — 36415 COLL VENOUS BLD VENIPUNCTURE: CPT

## 2024-05-08 PROCEDURE — 80061 LIPID PANEL: CPT

## 2024-05-08 PROCEDURE — 82550 ASSAY OF CK (CPK): CPT

## 2024-05-08 PROCEDURE — 80053 COMPREHEN METABOLIC PANEL: CPT

## 2024-05-20 ENCOUNTER — OFFICE VISIT (OUTPATIENT)
Dept: FAMILY MEDICINE CLINIC | Facility: CLINIC | Age: 69
End: 2024-05-20
Payer: MEDICARE

## 2024-05-20 VITALS
BODY MASS INDEX: 24.27 KG/M2 | SYSTOLIC BLOOD PRESSURE: 122 MMHG | DIASTOLIC BLOOD PRESSURE: 70 MMHG | TEMPERATURE: 99 F | WEIGHT: 151 LBS | OXYGEN SATURATION: 97 % | HEIGHT: 66 IN | HEART RATE: 75 BPM

## 2024-05-20 DIAGNOSIS — F32.89 OTHER DEPRESSION: ICD-10-CM

## 2024-05-20 DIAGNOSIS — E78.1 HYPERTRIGLYCERIDEMIA: ICD-10-CM

## 2024-05-20 DIAGNOSIS — I73.9 MICROANGIOPATHY (HCC): ICD-10-CM

## 2024-05-20 DIAGNOSIS — F41.9 ANXIETY: ICD-10-CM

## 2024-05-20 DIAGNOSIS — E78.2 MIXED HYPERLIPIDEMIA: ICD-10-CM

## 2024-05-20 DIAGNOSIS — R91.1 NODULE OF LEFT LUNG: Primary | ICD-10-CM

## 2024-05-20 PROCEDURE — G2211 COMPLEX E/M VISIT ADD ON: HCPCS | Performed by: NURSE PRACTITIONER

## 2024-05-20 PROCEDURE — 99214 OFFICE O/P EST MOD 30 MIN: CPT | Performed by: NURSE PRACTITIONER

## 2024-05-20 NOTE — PROGRESS NOTES
Depression Screening and Follow-up Plan: Patient's depression screening was positive with a PHQ-9 score of 6. Patient assessed for underlying major depression. Brief counseling provided and recommend additional follow-up/re-evaluation next office visit. Patient with underlying depression and was advised to continue current medications as prescribed. Patient advised to follow-up with PCP for further management. Doing better with the Lexapro     Falls Plan of Care: balance, strength, and gait training instructions were provided. Recommended assistive device to help with gait and balance. Medications that increase falls were reviewed. Assessed feet and footwear. Vitamin D supplementation was recommended. Home safety education provided.     Assessment/Plan:    Pt is a 69 yr old female   Presents in office for follow up on   Hyperlipidemia- was seen by cardiology no major concerns   Discussed low fat low cholesterol diet and better management of triglycerides . Hydrate well   Increase activity as tolerated.   CT lung follow up ordered - will see if covered.   Reviewed CT abdomen and CT brain discussed angiopathy and prevention of further angiopathy.   Refills sent follow up in 6 months      Problem List Items Addressed This Visit       Hypertriglyceridemia    Relevant Orders    Comprehensive metabolic panel    Lipid panel    TSH, 3rd generation with Free T4 reflex    UA/M w/rflx Culture, Routine    CBC and differential    Hyperlipidemia    Depression    Relevant Orders    Comprehensive metabolic panel    Lipid panel    TSH, 3rd generation with Free T4 reflex    UA/M w/rflx Culture, Routine    CBC and differential    Anxiety    Relevant Orders    Comprehensive metabolic panel    Lipid panel    TSH, 3rd generation with Free T4 reflex    UA/M w/rflx Culture, Routine    CBC and differential     Other Visit Diagnoses       Nodule of left lung    -  Primary    Relevant Orders    CT lung nodule follow-up    Comprehensive  metabolic panel    Lipid panel    TSH, 3rd generation with Free T4 reflex    UA/M w/rflx Culture, Routine    CBC and differential              Subjective:      Patient ID: Radha Santos is a 69 y.o. female.    Pt is a 69 yr old female   Presents in office for follow up on   Hyperlipidemia- was seen by cardiology no major concerns   Discussed low fat low cholesterol diet and better management of triglycerides . Hydrate well   Increase activity as tolerated.   CT lung follow up ordered - will see if covered.   Reviewed CT abdomen and CT brain discussed angiopathy and prevention of further angiopathy.   Refills sent follow up in 6 months         The following portions of the patient's history were reviewed and updated as appropriate:   Past Medical History:  She has a past medical history of Depression and Hyperlipemia.,  _______________________________________________________________________  Medical Problems:  does not have any pertinent problems on file.,  _______________________________________________________________________  Past Surgical History:   has no past surgical history on file.,  _______________________________________________________________________  Family History:  family history includes COPD in her mother; Heart disease in her mother; Lung cancer in her father.,  _______________________________________________________________________  Social History:   reports that she quit smoking about 25 years ago. Her smoking use included cigarettes. She started smoking about 50 years ago. She has a 37.5 pack-year smoking history. She has never used smokeless tobacco. She reports that she does not drink alcohol and does not use drugs.,  _______________________________________________________________________  Allergies:  has No Known Allergies..  _______________________________________________________________________  Current Outpatient Medications   Medication Sig Dispense Refill    escitalopram (LEXAPRO) 10 mg  "tablet Take 1 tablet (10 mg total) by mouth daily 30 tablet 2    rosuvastatin (CRESTOR) 20 MG tablet Take 20 mg by mouth daily      montelukast (SINGULAIR) 10 mg tablet Take 1 tablet (10 mg total) by mouth daily at bedtime 30 tablet 0     No current facility-administered medications for this visit.     _______________________________________________________________________  Review of Systems   Constitutional:  Positive for fatigue. Negative for fever and unexpected weight change (weight loss).   HENT:  Negative for congestion, nosebleeds and sore throat.    Eyes: Negative.    Respiratory:  Negative for cough and shortness of breath.    Cardiovascular:  Negative for chest pain and palpitations.        Hyperlipidemia  Sees Cardiology    Gastrointestinal:  Negative for abdominal distention, abdominal pain, nausea and vomiting.   Endocrine: Negative.    Genitourinary:  Negative for difficulty urinating and flank pain.   Musculoskeletal:  Positive for arthralgias and myalgias.   Skin:  Negative for rash.   Neurological:  Negative for headaches.        Microangiopathy - brain    Hematological:  Negative for adenopathy.   Psychiatric/Behavioral:  Negative for sleep disturbance and suicidal ideas. The patient is nervous/anxious (stable).          Objective:  Vitals:    05/20/24 1257   BP: 122/70   BP Location: Right arm   Patient Position: Sitting   Cuff Size: Adult   Pulse: 75   Temp: 99 °F (37.2 °C)   SpO2: 97%   Weight: 68.5 kg (151 lb)   Height: 5' 5.75\" (1.67 m)     Body mass index is 24.56 kg/m².     Physical Exam  Vitals and nursing note reviewed.   Constitutional:       Appearance: Normal appearance.   HENT:      Head: Atraumatic.   Eyes:      Extraocular Movements: Extraocular movements intact.   Cardiovascular:      Rate and Rhythm: Normal rate and regular rhythm.      Pulses: Normal pulses.      Heart sounds: Normal heart sounds.   Pulmonary:      Effort: Pulmonary effort is normal.      Breath sounds: Normal " breath sounds.   Abdominal:      Palpations: Abdomen is soft.   Musculoskeletal:      Cervical back: Normal range of motion.      Right lower leg: No edema.      Left lower leg: No edema.   Skin:     Capillary Refill: Capillary refill takes less than 2 seconds.   Neurological:      Mental Status: She is alert and oriented to person, place, and time.   Psychiatric:         Mood and Affect: Mood normal.         Behavior: Behavior normal.       Comprehensive metabolic panel  Order: [id number]   Status: Final result       Visible to patient: Yes (seen)       Next appt: 11/18/2024 at 01:00 PM in Family Medicine ([facility])       Dx: Hyperlipidemia, unspecified hyperlipi...    0 Result Notes       Component  Ref Range & Units 5/8/24  9:30 AM 3/18/24  9:39 AM   Sodium  135 - 147 mmol/L 140 139   Potassium  3.5 - 5.3 mmol/L 4.2 4.4   Chloride  96 - 108 mmol/L 103 102   CO2  21 - 32 mmol/L 30 27   ANION GAP  4 - 13 mmol/L 7 10   BUN  5 - 25 mg/dL 13 12   Creatinine  0.60 - 1.30 mg/dL 0.81 0.79 CM   Comment: Standardized to IDMS reference method   Glucose, Fasting  65 - 99 mg/dL 94 91   Calcium  8.4 - 10.2 mg/dL 9.5 9.5   AST  13 - 39 U/L 21 22   ALT  7 - 52 U/L 18 18 CM   Comment: Specimen collection should occur prior to Sulfasalazine administration due to the potential for falsely depressed results.   Alkaline Phosphatase  34 - 104 U/L 71 78   Total Protein  6.4 - 8.4 g/dL 7.4 7.2   Albumin  3.5 - 5.0 g/dL 4.4 4.6   Total Bilirubin  0.20 - 1.00 mg/dL 0.39 0.47 CM   Comment: Use of this assay is not recommended for patients undergoing treatment with eltrombopag due to the potential for falsely elevated results.  N-acetyl-p-benzoquinone imine (metabolite of Acetaminophen) will generate erroneously low results in samples for patients that have taken an overdose of Acetaminophen.   eGFR  ml/min/1.73sq m 74 77        Component  Ref Range & Units 5/8/24  9:30 AM   Cholesterol  See Comment mg/dL 140   Comment:  Cholesterol:        Pediatric <18 Years        Desirable          <170 mg/dL      Borderline High    170-199 mg/dL      High               >=200 mg/dL        Adult >=18 Years           Desirable         <200 mg/dL      Borderline High   200-239 mg/dL      High             >239 mg/dL   Triglycerides  See Comment mg/dL 163 High    Comment: Triglyceride:     0-9Y            <75mg/dL     10Y-17Y         <90 mg/dL       >=18Y     Normal          <150 mg/dL     Borderline High 150-199 mg/dL     High            200-499 mg/dL       Very High       >499 mg/dL    Specimen collection should occur prior to Metamizole administration due to the potential for falsely depressed results.   HDL, Direct  >=50 mg/dL 49 Low    LDL Calculated  0 - 100 mg/dL 58   Comment: LDL Cholesterol:    Optimal           <100 mg/dl    Near Optimal      100-129 mg/dl    Above Optimal      Borderline High 130-159 mg/dl      High            160-189 mg/dl      Very High       >189 mg/dl         This screening LDL is a calculated result.  It does not have the accuracy of the Direct Measured LDL in the monitoring of patients with hyperlipidemia and/or statin therapy.  Direct Measure LDL (GQD555) must be ordered separately in these patients.   Non-HDL-Chol (CHOL-HDL)  mg/dl 91              Specimen Collected: 05/08/24  9:30 AM Last Resulted: 05/08/24  7:11 PM         Study Result    Narrative & Impression   CT ABDOMEN AND PELVIS WITHOUT IV CONTRAST     INDICATION: H53.8: Other visual disturbances  R35.89: Other polyuria  R63.4: Abnormal weight loss  R63.1: Polydipsia  K90.9: Intestinal malabsorption, unspecified  R68.89: Other general symptoms and signs.     COMPARISON: None.     TECHNIQUE: CT examination of the abdomen and pelvis was performed without intravenous contrast. Multiplanar 2D reformatted images were created from the source data.     This examination, like all CT scans performed in the [facility], was performed utilizing  techniques to minimize radiation dose exposure, including the use of iterative reconstruction and automated exposure control. Radiation dose length   product (DLP) for this visit: 656 mGy-cm     Enteric Contrast: Not administered.     FINDINGS:     ABDOMEN     LOWER CHEST: No clinically significant abnormality in the visualized lower chest.     LIVER/BILIARY TREE: Unremarkable.     GALLBLADDER: No calcified gallstones. No pericholecystic inflammatory change.     SPLEEN: Unremarkable.     PANCREAS: Unremarkable.     ADRENAL GLANDS: Unremarkable.     KIDNEYS/URETERS: Several 2 to 3 mm calculi in the left kidney. Kidneys without hydronephrosis or hydroureter.     STOMACH AND BOWEL: Unremarkable.     APPENDIX: No findings to suggest appendicitis.     ABDOMINOPELVIC CAVITY: No ascites. No pneumoperitoneum. No lymphadenopathy.     VESSELS: Atherosclerosis without abdominal aortic aneurysm.     PELVIS     REPRODUCTIVE ORGANS: Unremarkable for patient's age.     URINARY BLADDER: Unremarkable.     ABDOMINAL WALL/INGUINAL REGIONS: Fat-containing umbilical hernia.     BONES: No acute fracture or suspicious osseous lesion. Spinal degenerative changes.     IMPRESSION:     1. Nonobstructing left renal calculi.  2. No adenopathy.     Workstation performed: [id number]      [employer] Result    Narrative & Impression   CT BRAIN - WITHOUT CONTRAST     INDICATION:   R41.3: Other amnesia  H53.8: Other visual disturbances  R63.4: Abnormal weight loss  R68.89: Other general symptoms and signs.     COMPARISON:  None.     TECHNIQUE:  CT examination of the brain was performed.  Multiplanar 2D reformatted images were created from the source data.     Radiation dose length product (DLP) for this visit:  851 mGy-cm .  This examination, like all CT scans performed in the [facility], was performed utilizing techniques to minimize radiation dose exposure, including the use of iterative   reconstruction and automated exposure control.      IMAGE QUALITY:  Diagnostic.     FINDINGS:     PARENCHYMA: Decreased attenuation is noted in periventricular and subcortical white matter demonstrating an appearance that is statistically most likely to represent mild microangiopathic change.     No CT signs of acute infarction.  No intracranial mass, mass effect or midline shift.  No acute parenchymal hemorrhage.     VENTRICLES AND EXTRA-AXIAL SPACES:  Normal for the patient's age.     VISUALIZED ORBITS: Normal visualized orbits.     PARANASAL SINUSES: Mild mucosal thickening of the visualized paranasal sinuses.     CALVARIUM AND EXTRACRANIAL SOFT TISSUES:  Normal.     IMPRESSION:     No acute intracranial abnormality.  Chronic microangiopathic changes.

## 2024-07-23 DIAGNOSIS — F32.89 OTHER DEPRESSION: ICD-10-CM

## 2024-07-23 RX ORDER — ESCITALOPRAM OXALATE 10 MG/1
10 TABLET ORAL DAILY
Qty: 30 TABLET | Refills: 2 | Status: SHIPPED | OUTPATIENT
Start: 2024-07-23

## 2024-10-28 DIAGNOSIS — F32.89 OTHER DEPRESSION: ICD-10-CM

## 2024-10-28 RX ORDER — ESCITALOPRAM OXALATE 10 MG/1
10 TABLET ORAL DAILY
Qty: 30 TABLET | Refills: 2 | Status: SHIPPED | OUTPATIENT
Start: 2024-10-28

## 2024-11-14 ENCOUNTER — RA CDI HCC (OUTPATIENT)
Dept: OTHER | Facility: HOSPITAL | Age: 69
End: 2024-11-14

## 2024-11-18 ENCOUNTER — APPOINTMENT (OUTPATIENT)
Dept: LAB | Facility: CLINIC | Age: 69
End: 2024-11-18
Payer: MEDICARE

## 2024-11-18 DIAGNOSIS — E78.1 HYPERTRIGLYCERIDEMIA: Primary | ICD-10-CM

## 2024-11-18 LAB
ALBUMIN SERPL BCG-MCNC: 4.5 G/DL (ref 3.5–5)
ALP SERPL-CCNC: 80 U/L (ref 34–104)
ALT SERPL W P-5'-P-CCNC: 30 U/L (ref 7–52)
ANION GAP SERPL CALCULATED.3IONS-SCNC: 7 MMOL/L (ref 4–13)
AST SERPL W P-5'-P-CCNC: 36 U/L (ref 13–39)
BASOPHILS # BLD AUTO: 0.04 THOUSANDS/ÂΜL (ref 0–0.1)
BASOPHILS NFR BLD AUTO: 1 % (ref 0–1)
BILIRUB SERPL-MCNC: 0.43 MG/DL (ref 0.2–1)
BILIRUB UR QL STRIP: NEGATIVE
BUN SERPL-MCNC: 12 MG/DL (ref 5–25)
CALCIUM SERPL-MCNC: 9.2 MG/DL (ref 8.4–10.2)
CHLORIDE SERPL-SCNC: 102 MMOL/L (ref 96–108)
CHOLEST SERPL-MCNC: 133 MG/DL (ref ?–200)
CLARITY UR: CLEAR
CO2 SERPL-SCNC: 29 MMOL/L (ref 21–32)
COLOR UR: COLORLESS
CREAT SERPL-MCNC: 0.82 MG/DL (ref 0.6–1.3)
EOSINOPHIL # BLD AUTO: 0.36 THOUSAND/ÂΜL (ref 0–0.61)
EOSINOPHIL NFR BLD AUTO: 5 % (ref 0–6)
ERYTHROCYTE [DISTWIDTH] IN BLOOD BY AUTOMATED COUNT: 12.1 % (ref 11.6–15.1)
GFR SERPL CREATININE-BSD FRML MDRD: 73 ML/MIN/1.73SQ M
GLUCOSE P FAST SERPL-MCNC: 93 MG/DL (ref 65–99)
GLUCOSE UR STRIP-MCNC: NEGATIVE MG/DL
HCT VFR BLD AUTO: 37.8 % (ref 34.8–46.1)
HDLC SERPL-MCNC: 41 MG/DL
HGB BLD-MCNC: 12.4 G/DL (ref 11.5–15.4)
HGB UR QL STRIP.AUTO: NEGATIVE
IMM GRANULOCYTES # BLD AUTO: 0.01 THOUSAND/UL (ref 0–0.2)
IMM GRANULOCYTES NFR BLD AUTO: 0 % (ref 0–2)
KETONES UR STRIP-MCNC: NEGATIVE MG/DL
LDLC SERPL CALC-MCNC: 62 MG/DL (ref 0–100)
LEUKOCYTE ESTERASE UR QL STRIP: NEGATIVE
LYMPHOCYTES # BLD AUTO: 2.51 THOUSANDS/ÂΜL (ref 0.6–4.47)
LYMPHOCYTES NFR BLD AUTO: 36 % (ref 14–44)
MCH RBC QN AUTO: 31.3 PG (ref 26.8–34.3)
MCHC RBC AUTO-ENTMCNC: 32.8 G/DL (ref 31.4–37.4)
MCV RBC AUTO: 96 FL (ref 82–98)
MONOCYTES # BLD AUTO: 0.49 THOUSAND/ÂΜL (ref 0.17–1.22)
MONOCYTES NFR BLD AUTO: 7 % (ref 4–12)
NEUTROPHILS # BLD AUTO: 3.54 THOUSANDS/ÂΜL (ref 1.85–7.62)
NEUTS SEG NFR BLD AUTO: 51 % (ref 43–75)
NITRITE UR QL STRIP: NEGATIVE
NONHDLC SERPL-MCNC: 92 MG/DL
NRBC BLD AUTO-RTO: 0 /100 WBCS
PH UR STRIP.AUTO: 6 [PH]
PLATELET # BLD AUTO: 251 THOUSANDS/UL (ref 149–390)
PMV BLD AUTO: 10.1 FL (ref 8.9–12.7)
POTASSIUM SERPL-SCNC: 4.3 MMOL/L (ref 3.5–5.3)
PROT SERPL-MCNC: 7.1 G/DL (ref 6.4–8.4)
PROT UR STRIP-MCNC: NEGATIVE MG/DL
RBC # BLD AUTO: 3.96 MILLION/UL (ref 3.81–5.12)
SODIUM SERPL-SCNC: 138 MMOL/L (ref 135–147)
SP GR UR STRIP.AUTO: 1.01 (ref 1–1.03)
TRIGL SERPL-MCNC: 151 MG/DL (ref ?–150)
TSH SERPL DL<=0.05 MIU/L-ACNC: 1.48 UIU/ML (ref 0.45–4.5)
UROBILINOGEN UR STRIP-ACNC: <2 MG/DL
WBC # BLD AUTO: 6.95 THOUSAND/UL (ref 4.31–10.16)

## 2024-11-18 PROCEDURE — 81003 URINALYSIS AUTO W/O SCOPE: CPT

## 2024-11-19 ENCOUNTER — RESULTS FOLLOW-UP (OUTPATIENT)
Dept: FAMILY MEDICINE CLINIC | Facility: CLINIC | Age: 69
End: 2024-11-19

## 2024-11-25 ENCOUNTER — OFFICE VISIT (OUTPATIENT)
Dept: FAMILY MEDICINE CLINIC | Facility: CLINIC | Age: 69
End: 2024-11-25
Payer: MEDICARE

## 2024-11-25 VITALS
DIASTOLIC BLOOD PRESSURE: 80 MMHG | TEMPERATURE: 98 F | SYSTOLIC BLOOD PRESSURE: 122 MMHG | HEIGHT: 66 IN | WEIGHT: 148 LBS | BODY MASS INDEX: 23.78 KG/M2 | HEART RATE: 99 BPM | OXYGEN SATURATION: 97 %

## 2024-11-25 DIAGNOSIS — F33.40 RECURRENT MAJOR DEPRESSIVE DISORDER, IN REMISSION (HCC): ICD-10-CM

## 2024-11-25 DIAGNOSIS — Z12.31 ENCOUNTER FOR SCREENING MAMMOGRAM FOR BREAST CANCER: ICD-10-CM

## 2024-11-25 DIAGNOSIS — F41.9 ANXIETY: Primary | ICD-10-CM

## 2024-11-25 DIAGNOSIS — E78.2 MIXED HYPERLIPIDEMIA: ICD-10-CM

## 2024-11-25 DIAGNOSIS — K59.09 OTHER CONSTIPATION: ICD-10-CM

## 2024-11-25 DIAGNOSIS — F32.89 OTHER DEPRESSION: ICD-10-CM

## 2024-11-25 DIAGNOSIS — E78.1 HYPERTRIGLYCERIDEMIA: ICD-10-CM

## 2024-11-25 DIAGNOSIS — Z12.11 SCREENING FOR COLON CANCER: ICD-10-CM

## 2024-11-25 DIAGNOSIS — Z78.0 MENOPAUSE: ICD-10-CM

## 2024-11-25 DIAGNOSIS — Z12.31 ENCOUNTER FOR SCREENING MAMMOGRAM FOR MALIGNANT NEOPLASM OF BREAST: ICD-10-CM

## 2024-11-25 PROCEDURE — 99214 OFFICE O/P EST MOD 30 MIN: CPT | Performed by: NURSE PRACTITIONER

## 2024-11-25 PROCEDURE — G2211 COMPLEX E/M VISIT ADD ON: HCPCS | Performed by: NURSE PRACTITIONER

## 2024-11-25 RX ORDER — SENNA AND DOCUSATE SODIUM 50; 8.6 MG/1; MG/1
1 TABLET, FILM COATED ORAL DAILY
Qty: 7 TABLET | Refills: 0 | Status: SHIPPED | OUTPATIENT
Start: 2024-11-25

## 2024-11-25 RX ORDER — POLYETHYLENE GLYCOL 3350 17 G/17G
17 POWDER, FOR SOLUTION ORAL DAILY
Qty: 510 G | Refills: 0 | Status: SHIPPED | OUTPATIENT
Start: 2024-11-25 | End: 2024-12-25

## 2024-11-25 NOTE — PROGRESS NOTES
Name: Radha Santos      : 1955      MRN: [id number]  Encounter Provider: BLAIRE Leiva  Encounter Date: 2024   Encounter department: [facility]  :  Assessment & Plan  Anxiety  Stable on lexapro 10 mg daily   Discussed current anxiety management  Discussed medications and reportable signs and symptoms  Discussed stress reduction  Incorporate 20 minutes walk deep breathing yoga  And continue follow-up with therapy especially if the medications are not working as planned  If any suicidal homicidal ideation or any panic attacks please go to the ER        Encounter for screening mammogram for breast cancer  Discussed preventive medicine   Orders:    Mammo screening bilateral w 3d and cad    Screening for colon cancer    Orders:    Cologuard    Other depression  DEPRESSION/anxiety  assessed and stable   Reviewed medications and plan of care   Denies any suicidal or homicidal ideations   Continue follow up every 4 months   Reportable s/s and discussed    Stable on lexapro          Hypertriglyceridemia  Hyperlipidemia diagnoses assessed and discussed   Reviewed medications and plan of care   Labs reviewed   2024 - Stable on Crestor   Discussed low fat low cholesterol diet   Discussed exercise and adequate hydration   Will continue to monitor every 4 months          Mixed hyperlipidemia  Improved but triglycerides still not at goal   Reinforced diet and exercise hydrate well        Menopause    Orders:    DXA bone density spine hip and pelvis    Encounter for screening mammogram for malignant neoplasm of breast    Orders:    Mammo screening bilateral w 3d and cad    Recurrent major depressive disorder, in remission (HCC)           Other constipation    Orders:    polyethylene glycol (GLYCOLAX) 17 GM/SCOOP powder; Take 17 g by mouth daily    senna-docusate sodium (SENOKOT-S) 8.6-50 mg per tablet; Take 1 tablet by mouth daily          Depression Screening and Follow-up  Plan: Patient was screened for depression during today's encounter. They screened negative with a PHQ-9 score of 4.    Falls Plan of Care: balance, strength, and gait training instructions were provided. Recommended assistive device to help with gait and balance. Medications that increase falls were reviewed. Assessed feet and footwear. Vitamin D supplementation was recommended. Home safety education provided.       History of Present Illness     Pt is a 69 yr old female   Presents in office for follow up on   Anxiety/depression - stable   Hyperlipemia - stable on Crestor   Here to review labs   Denies any major issues today       Review of Systems   Constitutional:  Negative for fatigue, fever and unexpected weight change (weight loss).   HENT:  Negative for congestion, nosebleeds and sore throat.    Eyes: Negative.    Respiratory:  Negative for cough and shortness of breath.    Cardiovascular:  Negative for chest pain and palpitations.        Hyperlipidemia  Sees Cardiology    Gastrointestinal:  Negative for abdominal distention, abdominal pain, nausea and vomiting.   Endocrine: Negative.    Genitourinary:  Negative for difficulty urinating and flank pain.   Musculoskeletal:  Positive for arthralgias and myalgias.   Skin:  Negative for rash.   Neurological:  Negative for headaches.        Microangiopathy - brain    Hematological:  Negative for adenopathy.   Psychiatric/Behavioral:  Negative for sleep disturbance and suicidal ideas. The patient is not nervous/anxious (stable).         Anxiety depression / stable      Pertinent Medical History   Reviewed       Medical History Reviewed by provider this encounter:  Tobacco  Allergies  Meds  Problems  Med Hx  Surg Hx  Fam Hx     .  Past Medical History   Past Medical History:   Diagnosis Date    Depression     Hyperlipemia     Shingles     Visual impairment      History reviewed. No pertinent surgical history.  Family History   Problem Relation Age of Onset     COPD Mother     Heart disease Mother     Thyroid disease Mother             Lung cancer Father     COPD Father             Cancer Father         Lung cancer    Breast cancer Maternal Grandmother     Breast cancer additional onset Maternal Grandmother     Alcohol abuse Brother             Substance Abuse Brother             Drug abuse Brother       reports that she quit smoking about 25 years ago. Her smoking use included cigarettes. She started smoking about 50 years ago. She has a 37.5 pack-year smoking history. She has never used smokeless tobacco. She reports that she does not drink alcohol and does not use drugs.  Current Outpatient Medications on File Prior to Visit   Medication Sig Dispense Refill    escitalopram (LEXAPRO) 10 mg tablet TAKE ONE TABLET BY MOUTH EVERY DAY 30 tablet 2    rosuvastatin (CRESTOR) 20 MG tablet Take 20 mg by mouth daily      [DISCONTINUED] montelukast (SINGULAIR) 10 mg tablet Take 1 tablet (10 mg total) by mouth daily at bedtime 30 tablet 0     No current facility-administered medications on file prior to visit.   No Known Allergies   Current Outpatient Medications on File Prior to Visit   Medication Sig Dispense Refill    escitalopram (LEXAPRO) 10 mg tablet TAKE ONE TABLET BY MOUTH EVERY DAY 30 tablet 2    rosuvastatin (CRESTOR) 20 MG tablet Take 20 mg by mouth daily      [DISCONTINUED] montelukast (SINGULAIR) 10 mg tablet Take 1 tablet (10 mg total) by mouth daily at bedtime 30 tablet 0     No current facility-administered medications on file prior to visit.      Social History     Tobacco Use    Smoking status: Former     Current packs/day: 0.00     Average packs/day: 1.5 packs/day for 25.0 years (37.5 ttl pk-yrs)     Types: Cigarettes     Start date:      Quit date:      Years since quittin.9    Smokeless tobacco: Never   Vaping Use    Vaping status: Never Used   Substance and Sexual Activity    Alcohol use: Never    Drug use: Never     "Sexual activity: Not on file        Objective   /80 (BP Location: Right arm, Patient Position: Sitting, Cuff Size: Adult)   Pulse 99   Temp 98 °F (36.7 °C)   Ht 5' 5.75\" (1.67 m)   Wt 67.1 kg (148 lb)   SpO2 97%   BMI 24.07 kg/m²      Physical Exam  Vitals and nursing note reviewed.   Constitutional:       Appearance: Normal appearance.   HENT:      Head: Atraumatic.      Nose: No congestion or rhinorrhea.   Eyes:      Extraocular Movements: Extraocular movements intact.   Cardiovascular:      Rate and Rhythm: Normal rate and regular rhythm.      Pulses: Normal pulses.      Heart sounds: Normal heart sounds.   Pulmonary:      Effort: Pulmonary effort is normal.      Breath sounds: Normal breath sounds.   Abdominal:      Palpations: Abdomen is soft.   Musculoskeletal:      Cervical back: Normal range of motion.      Right lower leg: No edema.      Left lower leg: No edema.   Skin:     General: Skin is warm.      Capillary Refill: Capillary refill takes less than 2 seconds.   Neurological:      Mental Status: She is alert and oriented to person, place, and time.   Psychiatric:         Mood and Affect: Mood normal.         Behavior: Behavior normal.      Contains abnormal data Lipid panel  Order: [id number]   Status: Final result       Next appt: 06/02/2025 at 02:20 PM in Family Medicine (BLAIRE Leiva)       Dx: Mixed hyperlipidemia; Hypertriglyceri...    Test Result Released: Yes (seen)       Messages: Seen    0 Result Notes       1 Patient Communication       View Follow-Up Encounter       Component  Ref Range & Units (hover) 11/18/24 11:22 AM 5/8/24  9:30 AM   Cholesterol 133 140 CM   Comment: Cholesterol:        Pediatric <18 Years        Desirable          <170 mg/dL      Borderline High    170-199 mg/dL      High               >=200 mg/dL        Adult >=18 Years           Desirable         <200 mg/dL      Borderline High   200-239 mg/dL      High             >239 mg/dL   Triglycerides 151 High "  163 High  CM   Comment: Triglyceride:     0-9Y            <75mg/dL     10Y-17Y         <90 mg/dL       >=18Y     Normal          <150 mg/dL     Borderline High 150-199 mg/dL     High            200-499 mg/dL       Very High       >499 mg/dL    Specimen collection should occur prior to Metamizole administration due to the potential for falsely depressed results.   HDL, Direct 41 Low  49 Low    LDL Calculated 62 58 CM   Comment: LDL Cholesterol:    Optimal           <100 mg/dl    Near Optimal      100-129 mg/dl    Above Optimal      Borderline High 130-159 mg/dl      High            160-189 mg/dl      Very High       >189 mg/dl         Comprehensive metabolic panel  Order: [id number]   Status: Final result       Next appt: 06/02/2025 at 02:20 PM in Family Medicine (BLAIRE Leiva)       Dx: Nodule of left lung; Anxiety; Hypertr...    Test Result Released: Yes (seen)       Messages: Not Seen    0 Result Notes       1 Patient Communication       View Follow-Up Encounter        Component  Ref Range & Units (hover) 11/18/24 11:22 AM 5/8/24  9:30 AM 3/18/24  9:39 AM   Sodium 138 140 139   Potassium 4.3 4.2 4.4   Chloride 102 103 102   CO2 29 30 27   ANION GAP 7 7 10   BUN 12 13 12   Creatinine 0.82 0.81 CM 0.79 CM   Comment: Standardized to IDMS reference method   Glucose, Fasting 93 94 91   Calcium 9.2 9.5 9.5   AST 36 21 22   ALT 30 18 CM 18 CM   Comment: Specimen collection should occur prior to Sulfasalazine administration due to the potential for falsely depressed results.   Alkaline Phosphatase 80 71 78   Total Protein 7.1 7.4 7.2   Albumin 4.5 4.4 4.6   Total Bilirubin 0.43 0.39 CM 0.47 CM   Comment: Use of this assay is not recommended for patients undergoing treatment with eltrombopag due to the potential for falsely elevated results.  N-acetyl-p-benzoquinone imine (metabolite of Acetaminophen) will generate erroneously low results in samples for patients that have taken an overdose of Acetaminophen.   eGFR  73 74 77           TSH, 3rd generation with Free T4 reflex  Order: [id number]   Status: Final result       Next appt: 06/02/2025 at 02:20 PM in Family Medicine (BLAIRE Leiva)       Dx: Nodule of left lung; Anxiety; Hypertr...    Test Result Released: Yes (seen)       Messages: Not Seen    0 Result Notes       1 Patient Communication       View Follow-Up Encounter       Component  Ref Range & Units (hover) 11/18/24 11:22 AM 3/18/24  9:39 AM   TSH 3RD GENERATON 1.480 1.482 CM   Comment: The recommended reference ranges for TSH during pregnancy are as follows:  First trimester 0.100 to 2.500 uIU/mL  Second trimester  0.200 to 3.000 uIU/mL  Third trimester 0.300 to 3.000 uIU/m               Component  Ref Range & Units (hover) 11/18/24 11:22 AM 3/18/24  9:39 AM   WBC 6.95 6.18   RBC 3.96 4.12   Hemoglobin 12.4 12.7   Hematocrit 37.8 37.9   MCV 96 92   MCH 31.3 30.8   MCHC 32.8 33.5   RDW 12.1 12.0   MPV 10.1 10.1   Platelets 251 264   nRBC 0 0   Segmented % 51 46   Immature Grans % 0 0   Lymphocytes % 36 39   Monocytes % 7 8   Eosinophils Relative 5 6   Basophils Relative 1 1   Absolute Neutrophils 3.54 2.87   Absolute Immature Grans 0.01 0.02   Absolute Lymphocytes 2.51 2.42   Absolute Monocytes 0.49 0.47   Eosinophils Absolute 0.36 0.36   Basophils Absolute 0.04 0.04             Specimen Collected: 11/18/24 11:22 AM Last Resulted: 11/18/24  7:51 PM              Component  Ref Range & Units (hover) 11/18/24 11:22 AM 3/18/24  9:39 AM   Color, UA Colorless Light Yellow   Clarity, UA Clear Clear   Specific Gravity, UA 1.006 1.011   pH, UA 6.0 7.0   Leukocytes, UA Negative Negative   Nitrite, UA Negative Negative   Protein, UA Negative Negative   Glucose, UA Negative Negative   Ketones, UA Negative Negative   Urobilinogen, UA <2.0 <2.0   Bilirubin, UA Negative Negative   Occult Blood, UA Negative Negative             Specimen Collected: 11/18/24 11:22 AM Last Resulted: 11/18/24  8:10 PM     Administrative  Statements   I have spent a total time of 35  minutes in caring for this patient on the day of the visit/encounter including Diagnostic results, Prognosis, Risks and benefits of tx options, Instructions for management, Patient and family education, Importance of tx compliance, Risk factor reductions, Impressions, Counseling / Coordination of care, Documenting in the medical record, Reviewing / ordering tests, medicine, procedures  , and Obtaining or reviewing history  . Topics discussed with the patient / family include symptom assessment and management, medication review, medication adjustment, and goals of care.

## 2024-11-25 NOTE — ASSESSMENT & PLAN NOTE
Stable on lexapro 10 mg daily   Discussed current anxiety management  Discussed medications and reportable signs and symptoms  Discussed stress reduction  Incorporate 20 minutes walk deep breathing yoga  And continue follow-up with therapy especially if the medications are not working as planned  If any suicidal homicidal ideation or any panic attacks please go to the ER

## 2024-11-25 NOTE — ASSESSMENT & PLAN NOTE
DEPRESSION/anxiety  assessed and stable   Reviewed medications and plan of care   Denies any suicidal or homicidal ideations   Continue follow up every 4 months   Reportable s/s and discussed    Stable on lexapro

## 2024-11-25 NOTE — ASSESSMENT & PLAN NOTE
Hyperlipidemia diagnoses assessed and discussed   Reviewed medications and plan of care   Labs reviewed   11/18/2024 - Stable on Crestor   Discussed low fat low cholesterol diet   Discussed exercise and adequate hydration   Will continue to monitor every 4 months

## 2025-01-10 ENCOUNTER — RESULTS FOLLOW-UP (OUTPATIENT)
Dept: FAMILY MEDICINE CLINIC | Facility: CLINIC | Age: 70
End: 2025-01-10

## 2025-01-10 LAB — COLOGUARD RESULT REPORTABLE: NEGATIVE

## 2025-01-29 DIAGNOSIS — E78.2 MIXED HYPERLIPIDEMIA: Primary | ICD-10-CM

## 2025-01-29 DIAGNOSIS — F32.89 OTHER DEPRESSION: ICD-10-CM

## 2025-01-30 RX ORDER — ROSUVASTATIN CALCIUM 20 MG/1
20 TABLET, COATED ORAL DAILY
Qty: 90 TABLET | Refills: 1 | Status: SHIPPED | OUTPATIENT
Start: 2025-01-30 | End: 2026-01-30

## 2025-01-30 RX ORDER — ESCITALOPRAM OXALATE 10 MG/1
10 TABLET ORAL DAILY
Qty: 90 TABLET | Refills: 1 | Status: SHIPPED | OUTPATIENT
Start: 2025-01-30 | End: 2025-01-31

## 2025-01-31 DIAGNOSIS — F32.89 OTHER DEPRESSION: ICD-10-CM

## 2025-01-31 RX ORDER — ESCITALOPRAM OXALATE 10 MG/1
TABLET ORAL
Qty: 30 TABLET | Refills: 2 | Status: SHIPPED | OUTPATIENT
Start: 2025-01-31

## 2025-06-02 ENCOUNTER — OFFICE VISIT (OUTPATIENT)
Dept: FAMILY MEDICINE CLINIC | Facility: CLINIC | Age: 70
End: 2025-06-02
Payer: MEDICARE

## 2025-06-02 VITALS
DIASTOLIC BLOOD PRESSURE: 80 MMHG | BODY MASS INDEX: 23.46 KG/M2 | HEIGHT: 66 IN | HEART RATE: 88 BPM | WEIGHT: 146 LBS | SYSTOLIC BLOOD PRESSURE: 118 MMHG | TEMPERATURE: 98.9 F | OXYGEN SATURATION: 96 %

## 2025-06-02 DIAGNOSIS — Z00.00 MEDICARE ANNUAL WELLNESS VISIT, SUBSEQUENT: ICD-10-CM

## 2025-06-02 DIAGNOSIS — Z12.31 ENCOUNTER FOR SCREENING MAMMOGRAM FOR BREAST CANCER: ICD-10-CM

## 2025-06-02 DIAGNOSIS — R41.3 MEMORY DEFICIT: ICD-10-CM

## 2025-06-02 DIAGNOSIS — R73.01 ELEVATED FASTING GLUCOSE: ICD-10-CM

## 2025-06-02 DIAGNOSIS — E78.49 OTHER HYPERLIPIDEMIA: ICD-10-CM

## 2025-06-02 DIAGNOSIS — E55.9 VITAMIN D DEFICIENCY: ICD-10-CM

## 2025-06-02 DIAGNOSIS — R31.29 MICROSCOPIC HEMATURIA: ICD-10-CM

## 2025-06-02 DIAGNOSIS — H53.8 BLURRED VISION: ICD-10-CM

## 2025-06-02 DIAGNOSIS — F41.9 ANXIETY: ICD-10-CM

## 2025-06-02 DIAGNOSIS — R68.89 FORGETFULNESS: ICD-10-CM

## 2025-06-02 DIAGNOSIS — Z87.891 HISTORY OF TOBACCO ABUSE: ICD-10-CM

## 2025-06-02 DIAGNOSIS — E78.1 HYPERTRIGLYCERIDEMIA: Primary | ICD-10-CM

## 2025-06-02 LAB — SL AMB POCT HEMOGLOBIN AIC: 5.5 (ref ?–6.5)

## 2025-06-02 PROCEDURE — G2211 COMPLEX E/M VISIT ADD ON: HCPCS | Performed by: NURSE PRACTITIONER

## 2025-06-02 PROCEDURE — 83036 HEMOGLOBIN GLYCOSYLATED A1C: CPT | Performed by: NURSE PRACTITIONER

## 2025-06-02 PROCEDURE — 99213 OFFICE O/P EST LOW 20 MIN: CPT | Performed by: NURSE PRACTITIONER

## 2025-06-02 PROCEDURE — G0439 PPPS, SUBSEQ VISIT: HCPCS | Performed by: NURSE PRACTITIONER

## 2025-06-02 NOTE — ASSESSMENT & PLAN NOTE
Will follow up on urine   Hydrate well   Will discuss further   Orders:    CBC and differential    Comprehensive metabolic panel    TSH, 3rd generation with Free T4 reflex    Lipid panel    Vitamin D 25 hydroxy    UA (URINE) with reflex to Scope

## 2025-06-02 NOTE — ASSESSMENT & PLAN NOTE
Stable on Lexapro   Discussed current anxiety management  Discussed medications and reportable signs and symptoms  Discussed stress reduction  Incorporate 20 minutes walk deep breathing yoga  And continue follow-up with therapy especially if the medications are not working as planned  If any suicidal homicidal ideation or any panic attacks please go to the ER   Orders:    CBC and differential    Comprehensive metabolic panel    TSH, 3rd generation with Free T4 reflex    Lipid panel    Vitamin D 25 hydroxy    UA (URINE) with reflex to Scope

## 2025-06-02 NOTE — PROGRESS NOTES
Name: Radha Santos      : 1955      MRN: [id number]  Encounter Provider: BLAIRE Leiva  Encounter Date: 2025   Encounter department: [facility] JENNIFER  :  Assessment & Plan  Encounter for screening mammogram for breast cancer  Ordered mammogram  Orders:    Mammo screening bilateral w 3d and cad; Future    Microscopic hematuria  Will follow up on urine   Hydrate well   Will discuss further   Orders:    CBC and differential    Comprehensive metabolic panel    TSH, 3rd generation with Free T4 reflex    Lipid panel    Vitamin D 25 hydroxy    UA (URINE) with reflex to Scope    Hypertriglyceridemia  Hyperlipidemia diagnoses assessed and discussed   Reviewed medications and plan of care   Labs reviewed   2024   Discussed low fat low cholesterol diet   Discussed exercise and adequate hydration   Will continue to monitor every 4 months     Orders:    CBC and differential    Comprehensive metabolic panel    TSH, 3rd generation with Free T4 reflex    Lipid panel    Vitamin D 25 hydroxy    UA (URINE) with reflex to Scope    Other hyperlipidemia    Orders:    CBC and differential    Comprehensive metabolic panel    TSH, 3rd generation with Free T4 reflex    Lipid panel    Vitamin D 25 hydroxy    UA (URINE) with reflex to Scope    History of tobacco abuse  No longer uses        Anxiety  Stable on Lexapro   Discussed current anxiety management  Discussed medications and reportable signs and symptoms  Discussed stress reduction  Incorporate 20 minutes walk deep breathing yoga  And continue follow-up with therapy especially if the medications are not working as planned  If any suicidal homicidal ideation or any panic attacks please go to the ER   Orders:    CBC and differential    Comprehensive metabolic panel    TSH, 3rd generation with Free T4 reflex    Lipid panel    Vitamin D 25 hydroxy    UA (URINE) with reflex to Scope    Elevated fasting glucose  Urinary frequency, weight loss,  eating sweets   Check diabetic panel 5.5   Orders:    UA (URINE) with reflex to Scope    POCT hemoglobin A1c    Vitamin D deficiency    Orders:    Vitamin D 25 hydroxy    Memory deficit  Worried about being forgetful family members have brought it up   She has had a lot of stress spouse is sick other family members hospitalized   Sometimes overstimulation and too many things going on at the same time can cause forgetful   Diet is not the healthiest   Increase protein hydrate well please   Take Magnesium 400 mg at bedtime   B12 also would help   Takes Melatonin for sleep   Orders:    MRI brain wo contrast; Future    POCT hemoglobin A1c    Forgetfulness  Worried about being forgetful family members have brought it up   She has had a lot of stress spouse is sick other family members hospitalized   Sometimes overstimulation and too many things going on at the same time can cause forgetful   Diet is not the healthiest   Increase protein hydrate well please   Take Magnesium 400 mg at bedtime   B12 also would help   Takes Melatonin for sleep     Will order MRI for now  Orders:    MRI brain wo contrast; Future    POCT hemoglobin A1c    Blurred vision  Follow up with opthalmology   Orders:    POCT hemoglobin A1c    Medicare annual wellness visit, subsequent  Medicare wellness needs met          Depression Screening and Follow-up Plan: Patient's depression screening was positive with a PHQ-9 score of 9.   Patient assessed for underlying major depression. Brief counseling provided and recommend additional follow-up/re-evaluation next office visit. Patient with underlying depression and was advised to continue current medications as prescribed. Depression likely due to other medical condition. Will treat underlying condition. Patient advised to follow-up with PCP for further management.     Falls Plan of Care: balance, strength, and gait training instructions were provided. Recommended assistive device to help with gait and  balance. Medications that increase falls were reviewed. Assessed feet and footwear. Vitamin D supplementation was recommended. Home safety education provided.       Preventive health issues were discussed with patient, and age appropriate screening tests were ordered as noted in patient's After Visit Summary. Personalized health advice and appropriate referrals for health education or preventive services given if needed, as noted in patient's After Visit Summary.    History of Present Illness     Patient is a 70-year-old female presents in office for follow-up on  Depression and anxiety has been stable on the Lexapro has had a lot of stress lately   Spouse has been sick.  Family members that were sick she has been overly stressed out  Feeling sometimes forgetful overwhelmed and she is worried about her memory issues here to discuss  She is due for Medicare wellness today  She is also due for blood work  Medications reviewed  Sleep continues to be an issue for her       Patient Care Team:  BLAIRE Leiva as PCP - General (Nurse Practitioner)    Review of Systems   Constitutional:  Negative for fatigue, fever and unexpected weight change (weight loss).   HENT:  Negative for congestion, nosebleeds and sore throat.    Eyes: Negative.    Respiratory:  Negative for cough and shortness of breath.    Cardiovascular:  Negative for chest pain and palpitations.        Hyperlipidemia  Sees Cardiology    Gastrointestinal:  Negative for abdominal distention, abdominal pain, nausea and vomiting.   Endocrine: Negative.    Genitourinary:  Negative for difficulty urinating and flank pain.   Musculoskeletal:  Positive for arthralgias and myalgias.   Skin:  Negative for rash.   Neurological:  Negative for headaches.        Microangiopathy - brain    Hematological:  Negative for adenopathy.   Psychiatric/Behavioral:  Negative for sleep disturbance and suicidal ideas. The patient is nervous/anxious (stable).         Anxiety  depression / stable   Positive depression screen  Forgetfulness     Medical History Reviewed by provider this encounter:  Meds  Problems       Annual Wellness Visit Questionnaire   Radha is here for her Subsequent Wellness visit.     Health Risk Assessment:   Patient rates overall health as good. Patient feels that their physical health rating is same. Patient is satisfied with their life. Eyesight was rated as slightly worse. Hearing was rated as slightly worse. Patient feels that their emotional and mental health rating is same. Patients states they are never, rarely angry. Patient states they are sometimes unusually tired/fatigued. Pain experienced in the last 7 days has been none. Patient states that she has experienced no weight loss or gain in last 6 months.     Depression Screening:   PHQ-9 Score: 9      Fall Risk Screening:   In the past year, patient has experienced: no history of falling in past year      Urinary Incontinence Screening:   Patient has not leaked urine accidently in the last six months.     Home Safety:  Patient does not have trouble with stairs inside or outside of their home. Patient has working smoke alarms and has working carbon monoxide detector. Home safety hazards include: none.     Nutrition:   Current diet is Unhealthy and Frequent junk food.     Medications:   Patient is not currently taking any over-the-counter supplements. Patient is able to manage medications.     Activities of Daily Living (ADLs)/Instrumental Activities of Daily Living (IADLs):   Walk and transfer into and out of bed and chair?: Yes  Dress and groom yourself?: Yes    Bathe or shower yourself?: Yes    Feed yourself? Yes  Do your laundry/housekeeping?: Yes  Manage your money, pay your bills and track your expenses?: Yes  Make your own meals?: Yes    Do your own shopping?: Yes    Previous Hospitalizations:   Any hospitalizations or ED visits within the last 12 months?: No      Advance Care Planning:   Living  will: No    Durable POA for healthcare: No    Advanced directive: No    Advanced directive counseling given: Yes    ACP document given: Yes      Cognitive Screening:   Provider or family/friend/caregiver concerned regarding cognition?: Yes    Cognition Comments: Discussed causes   Mini cog was normal     Does have depression /anxiety   Discussed diet and exercise     Preventive Screenings      Cardiovascular Screening:    General: Screening Not Indicated and History Lipid Disorder      Diabetes Screening:     General: Screening Current      Colorectal Cancer Screening:     General: Screening Current      Cervical Cancer Screening:    General: Screening Not Indicated      Lung Cancer Screening:     General: Screening Not Indicated    Immunizations:  - Immunizations due: Prevnar 20 and Zoster (Shingrix)    Screening, Brief Intervention, and Referral to Treatment (SBIRT)     Screening  Typical number of drinks in a day: 0  Typical number of drinks in a week: 0  Interpretation: Low risk drinking behavior.    AUDIT-C Screenin) How often did you have a drink containing alcohol in the past year? never  2) How many drinks did you have on a typical day when you were drinking in the past year? 0  3) How often did you have 6 or more drinks on one occasion in the past year? never    AUDIT-C Score: 0  Interpretation: Score 0-2 (female): Negative screen for alcohol misuse    Single Item Drug Screening:  How often have you used an illegal drug (including marijuana) or a prescription medication for non-medical reasons in the past year? never    Single Item Drug Screen Score: 0  Interpretation: Negative screen for possible drug use disorder    Time Spent  Time spent screening/evaluating the patient for alcohol misuse: 0 minutes. Time spent providing alcohol/substance abuse assessment and intervention services: 0 minutes.    Other Counseling Topics:   Car/seat belt/driving safety, skin self-exam, sunscreen and calcium and  "vitamin D intake and regular weightbearing exercise.     Social Drivers of Health     Financial Resource Strain: Patient Declined (2/23/2024)    Overall Financial Resource Strain (CARDIA)     Difficulty of Paying Living Expenses: Patient declined   Food Insecurity: Patient Declined (6/2/2025)    Nursing - Inadequate Food Risk Classification     Worried About Running Out of Food in the Last Year: Patient declined     Ran Out of Food in the Last Year: Patient declined   Transportation Needs: Patient Declined (6/2/2025)    PRAPARE - Transportation     Lack of Transportation (Medical): Patient declined     Lack of Transportation (Non-Medical): Patient declined   Housing Stability: Patient Declined (6/2/2025)    Housing Stability Vital Sign     Unable to Pay for Housing in the Last Year: Patient declined     Number of Times Moved in the Last Year: 0     Homeless in the Last Year: Patient declined   Utilities: Patient Declined (6/2/2025)    [facility] Utilities     Threatened with loss of utilities: Patient declined     No results found.    Objective   /80 (BP Location: Right arm, Patient Position: Sitting, Cuff Size: Adult)   Pulse 88   Temp 98.9 °F (37.2 °C)   Ht 5' 5.75\" (1.67 m)   Wt 66.2 kg (146 lb)   SpO2 96%   BMI 23.74 kg/m²     Physical Exam  Vitals and nursing note reviewed.   Constitutional:       Appearance: Normal appearance.      Comments: BMI 23.74   HENT:      Head: Atraumatic.      Nose: No congestion or rhinorrhea.     Eyes:      Extraocular Movements: Extraocular movements intact.       Cardiovascular:      Rate and Rhythm: Normal rate and regular rhythm.      Pulses: Normal pulses.      Heart sounds: Normal heart sounds.   Pulmonary:      Effort: Pulmonary effort is normal.      Breath sounds: Normal breath sounds.   Abdominal:      Palpations: Abdomen is soft.     Musculoskeletal:      Cervical back: Normal range of motion.      Right lower leg: No edema.      Left lower leg: No edema. " "    Skin:     General: Skin is warm.      Capillary Refill: Capillary refill takes less than 2 seconds.     Neurological:      Mental Status: She is alert and oriented to person, place, and time.     Psychiatric:         Mood and Affect: Mood normal.         Behavior: Behavior normal.       Administrative Statements   I have spent a total time of 45  minutes in caring for this patient on the day of the visit/encounter including Diagnostic results, Prognosis, Risks and benefits of tx options, Instructions for management, Patient and family education, Importance of tx compliance, Risk factor reductions, Impressions, Counseling / Coordination of care, Documenting in the medical record, Reviewing/placing orders in the medical record (including tests, medications, and/or procedures), Obtaining or reviewing history  , and Communicating with other healthcare professionals .    Portions of the record may have been created with voice recognition software.  Occasional wrong word or \"sound a like\" substitutions may have occurred due to the inherent limitations of voice recognition software.  Read the chart carefully and recognize, using context, where substitutions have occurred   "

## 2025-06-02 NOTE — ASSESSMENT & PLAN NOTE
Orders:    CBC and differential    Comprehensive metabolic panel    TSH, 3rd generation with Free T4 reflex    Lipid panel    Vitamin D 25 hydroxy    UA (URINE) with reflex to Scope

## 2025-07-02 ENCOUNTER — APPOINTMENT (OUTPATIENT)
Dept: LAB | Facility: CLINIC | Age: 70
End: 2025-07-02
Payer: MEDICARE

## 2025-07-02 LAB
25(OH)D3 SERPL-MCNC: 15 NG/ML (ref 30–100)
ALBUMIN SERPL BCG-MCNC: 4.5 G/DL (ref 3.5–5)
ALP SERPL-CCNC: 76 U/L (ref 34–104)
ALT SERPL W P-5'-P-CCNC: 13 U/L (ref 7–52)
ANION GAP SERPL CALCULATED.3IONS-SCNC: 10 MMOL/L (ref 4–13)
AST SERPL W P-5'-P-CCNC: 23 U/L (ref 13–39)
BACTERIA UR QL AUTO: NORMAL /HPF
BASOPHILS # BLD AUTO: 0.04 THOUSANDS/ÂΜL (ref 0–0.1)
BASOPHILS NFR BLD AUTO: 1 % (ref 0–1)
BILIRUB SERPL-MCNC: 0.4 MG/DL (ref 0.2–1)
BILIRUB UR QL STRIP: NEGATIVE
BUN SERPL-MCNC: 11 MG/DL (ref 5–25)
CALCIUM SERPL-MCNC: 9.4 MG/DL (ref 8.4–10.2)
CHLORIDE SERPL-SCNC: 103 MMOL/L (ref 96–108)
CHOLEST SERPL-MCNC: 138 MG/DL (ref ?–200)
CLARITY UR: CLEAR
CO2 SERPL-SCNC: 26 MMOL/L (ref 21–32)
COLOR UR: COLORLESS
CREAT SERPL-MCNC: 0.77 MG/DL (ref 0.6–1.3)
EOSINOPHIL # BLD AUTO: 0.37 THOUSAND/ÂΜL (ref 0–0.61)
EOSINOPHIL NFR BLD AUTO: 6 % (ref 0–6)
ERYTHROCYTE [DISTWIDTH] IN BLOOD BY AUTOMATED COUNT: 11.9 % (ref 11.6–15.1)
GFR SERPL CREATININE-BSD FRML MDRD: 79 ML/MIN/1.73SQ M
GLUCOSE P FAST SERPL-MCNC: 87 MG/DL (ref 65–99)
GLUCOSE UR STRIP-MCNC: NEGATIVE MG/DL
HCT VFR BLD AUTO: 37 % (ref 34.8–46.1)
HDLC SERPL-MCNC: 47 MG/DL
HGB BLD-MCNC: 12.2 G/DL (ref 11.5–15.4)
HGB UR QL STRIP.AUTO: NEGATIVE
IMM GRANULOCYTES # BLD AUTO: 0.01 THOUSAND/UL (ref 0–0.2)
IMM GRANULOCYTES NFR BLD AUTO: 0 % (ref 0–2)
KETONES UR STRIP-MCNC: NEGATIVE MG/DL
LDLC SERPL CALC-MCNC: 62 MG/DL (ref 0–100)
LEUKOCYTE ESTERASE UR QL STRIP: ABNORMAL
LYMPHOCYTES # BLD AUTO: 2.37 THOUSANDS/ÂΜL (ref 0.6–4.47)
LYMPHOCYTES NFR BLD AUTO: 39 % (ref 14–44)
MCH RBC QN AUTO: 31 PG (ref 26.8–34.3)
MCHC RBC AUTO-ENTMCNC: 33 G/DL (ref 31.4–37.4)
MCV RBC AUTO: 94 FL (ref 82–98)
MONOCYTES # BLD AUTO: 0.55 THOUSAND/ÂΜL (ref 0.17–1.22)
MONOCYTES NFR BLD AUTO: 9 % (ref 4–12)
NEUTROPHILS # BLD AUTO: 2.75 THOUSANDS/ÂΜL (ref 1.85–7.62)
NEUTS SEG NFR BLD AUTO: 45 % (ref 43–75)
NITRITE UR QL STRIP: NEGATIVE
NON-SQ EPI CELLS URNS QL MICRO: NORMAL /HPF
NONHDLC SERPL-MCNC: 91 MG/DL
NRBC BLD AUTO-RTO: 0 /100 WBCS
PH UR STRIP.AUTO: 6.5 [PH]
PLATELET # BLD AUTO: 244 THOUSANDS/UL (ref 149–390)
PMV BLD AUTO: 9.8 FL (ref 8.9–12.7)
POTASSIUM SERPL-SCNC: 4.6 MMOL/L (ref 3.5–5.3)
PROT SERPL-MCNC: 7.2 G/DL (ref 6.4–8.4)
PROT UR STRIP-MCNC: NEGATIVE MG/DL
RBC # BLD AUTO: 3.93 MILLION/UL (ref 3.81–5.12)
RBC #/AREA URNS AUTO: NORMAL /HPF
SODIUM SERPL-SCNC: 139 MMOL/L (ref 135–147)
SP GR UR STRIP.AUTO: 1 (ref 1–1.03)
TRIGL SERPL-MCNC: 146 MG/DL (ref ?–150)
TSH SERPL DL<=0.05 MIU/L-ACNC: 1.29 UIU/ML (ref 0.45–4.5)
UROBILINOGEN UR STRIP-ACNC: <2 MG/DL
WBC # BLD AUTO: 6.09 THOUSAND/UL (ref 4.31–10.16)
WBC #/AREA URNS AUTO: NORMAL /HPF

## 2025-07-11 ENCOUNTER — TELEPHONE (OUTPATIENT)
Age: 70
End: 2025-07-11

## 2025-07-11 ENCOUNTER — PATIENT MESSAGE (OUTPATIENT)
Dept: FAMILY MEDICINE CLINIC | Facility: CLINIC | Age: 70
End: 2025-07-11

## 2025-07-11 DIAGNOSIS — R30.0 DYSURIA: Primary | ICD-10-CM

## 2025-07-11 RX ORDER — NITROFURANTOIN 25; 75 MG/1; MG/1
100 CAPSULE ORAL 2 TIMES DAILY
Qty: 10 CAPSULE | Refills: 0 | Status: SHIPPED | OUTPATIENT
Start: 2025-07-11 | End: 2025-07-16

## 2025-07-11 NOTE — TELEPHONE ENCOUNTER
Patient believes urine test came back positive. Is requesting medication sent to pharmacy and a call back to advise.

## 2025-07-11 NOTE — TELEPHONE ENCOUNTER
I sent the antibiotic    I dont need to see how now I just needed to know if any symptoms   I asencio end pt a message

## 2025-07-25 ENCOUNTER — HOSPITAL ENCOUNTER (OUTPATIENT)
Dept: MRI IMAGING | Facility: HOSPITAL | Age: 70
End: 2025-07-25
Attending: NURSE PRACTITIONER
Payer: MEDICARE

## 2025-07-25 DIAGNOSIS — R68.89 FORGETFULNESS: ICD-10-CM

## 2025-07-25 DIAGNOSIS — R41.3 MEMORY DEFICIT: ICD-10-CM

## 2025-07-25 PROCEDURE — 70551 MRI BRAIN STEM W/O DYE: CPT

## 2025-08-08 DIAGNOSIS — E78.2 MIXED HYPERLIPIDEMIA: ICD-10-CM

## 2025-08-08 RX ORDER — ROSUVASTATIN CALCIUM 20 MG/1
20 TABLET, COATED ORAL DAILY
Qty: 90 TABLET | Refills: 1 | Status: SHIPPED | OUTPATIENT
Start: 2025-08-08

## 2025-08-21 DIAGNOSIS — F32.89 OTHER DEPRESSION: ICD-10-CM

## 2025-08-22 RX ORDER — ESCITALOPRAM OXALATE 10 MG/1
10 TABLET ORAL DAILY
Qty: 30 TABLET | Refills: 5 | Status: SHIPPED | OUTPATIENT
Start: 2025-08-22